# Patient Record
Sex: MALE | Race: WHITE | Employment: UNEMPLOYED | URBAN - METROPOLITAN AREA
[De-identification: names, ages, dates, MRNs, and addresses within clinical notes are randomized per-mention and may not be internally consistent; named-entity substitution may affect disease eponyms.]

---

## 2018-12-15 ENCOUNTER — HOSPITAL ENCOUNTER (EMERGENCY)
Age: 53
Discharge: HOME | End: 2018-12-15
Attending: EMERGENCY MEDICINE
Payer: SELF-PAY

## 2018-12-15 ENCOUNTER — APPOINTMENT (OUTPATIENT)
Dept: CT IMAGING | Age: 53
End: 2018-12-15
Attending: EMERGENCY MEDICINE
Payer: SELF-PAY

## 2018-12-15 VITALS — OXYGEN SATURATION: 96 % | TEMPERATURE: 98.4 F | DIASTOLIC BLOOD PRESSURE: 91 MMHG | SYSTOLIC BLOOD PRESSURE: 175 MMHG

## 2018-12-15 DIAGNOSIS — R11.15 NON-INTRACTABLE CYCLICAL VOMITING WITH NAUSEA: ICD-10-CM

## 2018-12-15 DIAGNOSIS — R10.84 ABDOMINAL PAIN, GENERALIZED: Primary | ICD-10-CM

## 2018-12-15 DIAGNOSIS — Z78.9 ALCOHOL USE: ICD-10-CM

## 2018-12-15 DIAGNOSIS — F12.90 MARIJUANA USE: ICD-10-CM

## 2018-12-15 LAB
ALBUMIN SERPL-MCNC: 4.1 G/DL (ref 3.5–5)
ALBUMIN/GLOB SERPL: 1.1 {RATIO} (ref 1.1–2.2)
ALP SERPL-CCNC: 82 U/L (ref 45–117)
ALT SERPL-CCNC: 22 U/L (ref 12–78)
AMPHET UR QL SCN: NEGATIVE
ANION GAP SERPL CALC-SCNC: 13 MMOL/L (ref 5–15)
APPEARANCE UR: CLEAR
AST SERPL-CCNC: 21 U/L (ref 15–37)
BACTERIA URNS QL MICRO: NEGATIVE /HPF
BARBITURATES UR QL SCN: NEGATIVE
BASOPHILS # BLD: 0.1 K/UL (ref 0–0.1)
BASOPHILS NFR BLD: 1 % (ref 0–1)
BENZODIAZ UR QL: NEGATIVE
BILIRUB SERPL-MCNC: 0.4 MG/DL (ref 0.2–1)
BILIRUB UR QL: NEGATIVE
BUN SERPL-MCNC: 6 MG/DL (ref 6–20)
BUN/CREAT SERPL: 8 (ref 12–20)
CALCIUM SERPL-MCNC: 9.5 MG/DL (ref 8.5–10.1)
CANNABINOIDS UR QL SCN: POSITIVE
CHLORIDE SERPL-SCNC: 98 MMOL/L (ref 97–108)
CO2 SERPL-SCNC: 21 MMOL/L (ref 21–32)
COCAINE UR QL SCN: NEGATIVE
COLOR UR: ABNORMAL
CREAT SERPL-MCNC: 0.71 MG/DL (ref 0.7–1.3)
DIFFERENTIAL METHOD BLD: ABNORMAL
DRUG SCRN COMMENT,DRGCM: ABNORMAL
EOSINOPHIL # BLD: 0.1 K/UL (ref 0–0.4)
EOSINOPHIL NFR BLD: 1 % (ref 0–7)
EPITH CASTS URNS QL MICRO: ABNORMAL /LPF
ERYTHROCYTE [DISTWIDTH] IN BLOOD BY AUTOMATED COUNT: 13 % (ref 11.5–14.5)
ETHANOL SERPL-MCNC: 133 MG/DL
GLOBULIN SER CALC-MCNC: 3.7 G/DL (ref 2–4)
GLUCOSE SERPL-MCNC: 91 MG/DL (ref 65–100)
GLUCOSE UR STRIP.AUTO-MCNC: NEGATIVE MG/DL
HCT VFR BLD AUTO: 42.8 % (ref 36.6–50.3)
HGB BLD-MCNC: 14.8 G/DL (ref 12.1–17)
HGB UR QL STRIP: NEGATIVE
HYALINE CASTS URNS QL MICRO: ABNORMAL /LPF (ref 0–5)
IMM GRANULOCYTES # BLD: 0.1 K/UL (ref 0–0.04)
IMM GRANULOCYTES NFR BLD AUTO: 1 % (ref 0–0.5)
KETONES UR QL STRIP.AUTO: ABNORMAL MG/DL
LEUKOCYTE ESTERASE UR QL STRIP.AUTO: NEGATIVE
LIPASE SERPL-CCNC: 78 U/L (ref 73–393)
LYMPHOCYTES # BLD: 1.7 K/UL (ref 0.8–3.5)
LYMPHOCYTES NFR BLD: 17 % (ref 12–49)
MCH RBC QN AUTO: 31.2 PG (ref 26–34)
MCHC RBC AUTO-ENTMCNC: 34.6 G/DL (ref 30–36.5)
MCV RBC AUTO: 90.1 FL (ref 80–99)
METHADONE UR QL: NEGATIVE
MONOCYTES # BLD: 0.5 K/UL (ref 0–1)
MONOCYTES NFR BLD: 5 % (ref 5–13)
NEUTS SEG # BLD: 7.4 K/UL (ref 1.8–8)
NEUTS SEG NFR BLD: 75 % (ref 32–75)
NITRITE UR QL STRIP.AUTO: NEGATIVE
NRBC # BLD: 0 K/UL (ref 0–0.01)
NRBC BLD-RTO: 0 PER 100 WBC
OPIATES UR QL: NEGATIVE
PCP UR QL: NEGATIVE
PH UR STRIP: 6 [PH] (ref 5–8)
PLATELET # BLD AUTO: 284 K/UL (ref 150–400)
PMV BLD AUTO: 9.3 FL (ref 8.9–12.9)
POTASSIUM SERPL-SCNC: 4 MMOL/L (ref 3.5–5.1)
PROT SERPL-MCNC: 7.8 G/DL (ref 6.4–8.2)
PROT UR STRIP-MCNC: NEGATIVE MG/DL
RBC # BLD AUTO: 4.75 M/UL (ref 4.1–5.7)
RBC #/AREA URNS HPF: ABNORMAL /HPF (ref 0–5)
SODIUM SERPL-SCNC: 132 MMOL/L (ref 136–145)
SP GR UR REFRACTOMETRY: 1.01 (ref 1–1.03)
UA: UC IF INDICATED,UAUC: ABNORMAL
UROBILINOGEN UR QL STRIP.AUTO: 0.2 EU/DL (ref 0.2–1)
WBC # BLD AUTO: 9.9 K/UL (ref 4.1–11.1)
WBC URNS QL MICRO: ABNORMAL /HPF (ref 0–4)

## 2018-12-15 PROCEDURE — 96361 HYDRATE IV INFUSION ADD-ON: CPT

## 2018-12-15 PROCEDURE — 83690 ASSAY OF LIPASE: CPT

## 2018-12-15 PROCEDURE — 74011636320 HC RX REV CODE- 636/320: Performed by: EMERGENCY MEDICINE

## 2018-12-15 PROCEDURE — 74011250636 HC RX REV CODE- 250/636: Performed by: EMERGENCY MEDICINE

## 2018-12-15 PROCEDURE — 80307 DRUG TEST PRSMV CHEM ANLYZR: CPT

## 2018-12-15 PROCEDURE — 96375 TX/PRO/DX INJ NEW DRUG ADDON: CPT

## 2018-12-15 PROCEDURE — 81001 URINALYSIS AUTO W/SCOPE: CPT

## 2018-12-15 PROCEDURE — 74011250637 HC RX REV CODE- 250/637: Performed by: EMERGENCY MEDICINE

## 2018-12-15 PROCEDURE — 80053 COMPREHEN METABOLIC PANEL: CPT

## 2018-12-15 PROCEDURE — 74177 CT ABD & PELVIS W/CONTRAST: CPT

## 2018-12-15 PROCEDURE — 96374 THER/PROPH/DIAG INJ IV PUSH: CPT

## 2018-12-15 PROCEDURE — 36415 COLL VENOUS BLD VENIPUNCTURE: CPT

## 2018-12-15 PROCEDURE — 85025 COMPLETE CBC W/AUTO DIFF WBC: CPT

## 2018-12-15 PROCEDURE — 99284 EMERGENCY DEPT VISIT MOD MDM: CPT

## 2018-12-15 RX ORDER — PROCHLORPERAZINE MALEATE 10 MG
10 TABLET ORAL
Status: COMPLETED | OUTPATIENT
Start: 2018-12-15 | End: 2018-12-15

## 2018-12-15 RX ORDER — PANTOPRAZOLE SODIUM 40 MG/1
40 TABLET, DELAYED RELEASE ORAL DAILY
Qty: 20 TAB | Refills: 0 | Status: SHIPPED | OUTPATIENT
Start: 2018-12-15 | End: 2019-01-04

## 2018-12-15 RX ORDER — MORPHINE SULFATE 4 MG/ML
4 INJECTION INTRAVENOUS
Status: COMPLETED | OUTPATIENT
Start: 2018-12-15 | End: 2018-12-15

## 2018-12-15 RX ORDER — SODIUM CHLORIDE 9 MG/ML
50 INJECTION, SOLUTION INTRAVENOUS
Status: COMPLETED | OUTPATIENT
Start: 2018-12-15 | End: 2018-12-15

## 2018-12-15 RX ORDER — SODIUM CHLORIDE 0.9 % (FLUSH) 0.9 %
10 SYRINGE (ML) INJECTION
Status: COMPLETED | OUTPATIENT
Start: 2018-12-15 | End: 2018-12-15

## 2018-12-15 RX ORDER — ONDANSETRON 4 MG/1
4 TABLET, ORALLY DISINTEGRATING ORAL
Qty: 10 TAB | Refills: 0 | Status: SHIPPED | OUTPATIENT
Start: 2018-12-15 | End: 2019-09-05

## 2018-12-15 RX ORDER — PROCHLORPERAZINE EDISYLATE 5 MG/ML
10 INJECTION INTRAMUSCULAR; INTRAVENOUS
Status: COMPLETED | OUTPATIENT
Start: 2018-12-15 | End: 2018-12-15

## 2018-12-15 RX ADMIN — PROCHLORPERAZINE MALEATE 10 MG: 10 TABLET, FILM COATED ORAL at 07:51

## 2018-12-15 RX ADMIN — MORPHINE SULFATE 4 MG: 4 INJECTION INTRAVENOUS at 08:24

## 2018-12-15 RX ADMIN — SODIUM CHLORIDE 1000 ML: 900 INJECTION, SOLUTION INTRAVENOUS at 05:27

## 2018-12-15 RX ADMIN — IOPAMIDOL 100 ML: 755 INJECTION, SOLUTION INTRAVENOUS at 06:42

## 2018-12-15 RX ADMIN — SODIUM CHLORIDE 50 ML/HR: 900 INJECTION, SOLUTION INTRAVENOUS at 06:42

## 2018-12-15 RX ADMIN — Medication 10 ML: at 06:42

## 2018-12-15 RX ADMIN — PROCHLORPERAZINE EDISYLATE 10 MG: 5 INJECTION INTRAMUSCULAR; INTRAVENOUS at 05:27

## 2018-12-15 NOTE — ED NOTES
Pt's emergency contact Othelia Halsted contacted for pt ride home. Stated pt's sister will be contacted for possible . To call Charge RN back.

## 2018-12-15 NOTE — ED NOTES
Pt's friend to pick pt up from waiting room. Pt safely escorted to waiting room area. No acute changes noted and pt took discharge paperwork with him.

## 2018-12-15 NOTE — DISCHARGE INSTRUCTIONS

## 2018-12-15 NOTE — ED PROVIDER NOTES
EMERGENCY DEPARTMENT HISTORY AND PHYSICAL EXAM      Date: 12/15/2018  Patient Name: Monica Akbar    History of Presenting Illness     Chief Complaint   Patient presents with    Vomiting    Abdominal Pain       History Provided By: Patient and EMS    HPI: Monica Akbar, 46 y.o. male with PMHx significant for alcohol induced pancreatitis, presents via EMS to the ED with c/o new onset N/V with associated generalized abdominal pain that began 2 hours ago. Per EMS, pt had a few episodes of vomiting en route to the ED. He reports taking anti-emetic when his symptoms first began without any improvement. Pt endorses hx of similar symptoms that typically resolve with \"pain pill\" and antiemetics. He states he has been told his symptoms were related to pancreatitis and SBO in the past. He endorses Marijuana use regularly, but states his last use was 2-3 days ago. He also discloses alcohol use today. Pt notes his most recent BM was 1 day ago. Per EMS, pt was tachycardiac and mildly hypertensive during transport. EMS notes pt hasn't taken his BP medication in 2 weeks. He denies any recent fever. There are no other complaints, changes, or physical findings at this time. PCP: None    Current Outpatient Medications   Medication Sig Dispense Refill    ondansetron (ZOFRAN ODT) 4 mg disintegrating tablet Take 1 Tab by mouth every eight (8) hours as needed for Nausea. 10 Tab 0    pantoprazole (PROTONIX) 40 mg tablet Take 1 Tab by mouth daily for 20 days. 20 Tab 0    oxyCODONE-acetaminophen (PERCOCET) 5-325 mg per tablet Take 2 Tabs by mouth every four (4) hours as needed for Pain. Max Daily Amount: 12 Tabs. 5 Tab 0    lisinopril (PRINIVIL, ZESTRIL) 10 mg tablet Take 10 mg by mouth daily.  cyclobenzaprine (FLEXERIL) 5 mg tablet Take 5 mg by mouth two (2) times a day.  busPIRone (BUSPAR) 7.5 mg tablet Take 7.5 mg by mouth three (3) times daily.       famotidine (PEPCID) 40 mg tablet Take 1 Tab by mouth nightly. 30 Tab 0    promethazine (PHENERGAN) 25 mg suppository Insert 1 Suppository into rectum every six (6) hours as needed for Nausea. 12 Suppository 0       Past History     Past Medical History:  Past Medical History:   Diagnosis Date    Gastrointestinal disorder     pancreatitis    Gastrointestinal disorder     mass       Past Surgical History:  History reviewed. No pertinent surgical history. Family History:  No family history on file. Social History:  Social History     Tobacco Use    Smoking status: Current Every Day Smoker     Packs/day: 1.00    Smokeless tobacco: Never Used   Substance Use Topics    Alcohol use: Yes     Alcohol/week: 6.0 oz     Types: 12 Cans of beer per week     Comment: 20/week    Drug use: Yes     Types: Marijuana     Comment: occasionally       Allergies: Allergies   Allergen Reactions    Bees [Hymenoptera Allergenic Extract] Hives    Codeine Hives         Review of Systems   Review of Systems   Constitutional: Negative for chills and fever. HENT: Negative. Negative for congestion, rhinorrhea, sneezing and sore throat. Eyes: Negative. Negative for redness and visual disturbance. Respiratory: Negative. Negative for cough, shortness of breath and wheezing. Cardiovascular: Negative. Negative for chest pain and leg swelling. Gastrointestinal: Positive for abdominal pain, nausea and vomiting. Negative for diarrhea. Genitourinary: Negative. Negative for difficulty urinating, discharge and frequency. Musculoskeletal: Negative. Negative for arthralgias, back pain, myalgias and neck stiffness. Skin: Negative. Negative for color change and rash. Neurological: Negative. Negative for dizziness, syncope, weakness, numbness and headaches. Hematological: Negative for adenopathy. Psychiatric/Behavioral: Negative. All other systems reviewed and are negative.       Physical Exam   Physical Exam   Constitutional: He is oriented to person, place, and time. Active vomiting. Emesis in bag. Uncomfortable appearing    HENT:   Head: Atraumatic. Eyes: EOM are normal.   Cardiovascular: Regular rhythm, normal heart sounds and intact distal pulses. Tachycardia present. Exam reveals no gallop and no friction rub. No murmur heard. Pulmonary/Chest: Effort normal and breath sounds normal. No respiratory distress. He has no wheezes. He has no rales. He exhibits no tenderness. Abdominal: Soft. Bowel sounds are normal. He exhibits no distension and no mass. There is no tenderness. There is no rebound and no guarding. Musculoskeletal: Normal range of motion. He exhibits no edema or tenderness. Neurological: He is alert and oriented to person, place, and time. Psychiatric: He has a normal mood and affect. Nursing note and vitals reviewed. Diagnostic Study Results     Labs -     Recent Results (from the past 12 hour(s))   CBC WITH AUTOMATED DIFF    Collection Time: 12/15/18  5:25 AM   Result Value Ref Range    WBC 9.9 4.1 - 11.1 K/uL    RBC 4.75 4.10 - 5.70 M/uL    HGB 14.8 12.1 - 17.0 g/dL    HCT 42.8 36.6 - 50.3 %    MCV 90.1 80.0 - 99.0 FL    MCH 31.2 26.0 - 34.0 PG    MCHC 34.6 30.0 - 36.5 g/dL    RDW 13.0 11.5 - 14.5 %    PLATELET 640 391 - 194 K/uL    MPV 9.3 8.9 - 12.9 FL    NRBC 0.0 0  WBC    ABSOLUTE NRBC 0.00 0.00 - 0.01 K/uL    NEUTROPHILS 75 32 - 75 %    LYMPHOCYTES 17 12 - 49 %    MONOCYTES 5 5 - 13 %    EOSINOPHILS 1 0 - 7 %    BASOPHILS 1 0 - 1 %    IMMATURE GRANULOCYTES 1 (H) 0.0 - 0.5 %    ABS. NEUTROPHILS 7.4 1.8 - 8.0 K/UL    ABS. LYMPHOCYTES 1.7 0.8 - 3.5 K/UL    ABS. MONOCYTES 0.5 0.0 - 1.0 K/UL    ABS. EOSINOPHILS 0.1 0.0 - 0.4 K/UL    ABS. BASOPHILS 0.1 0.0 - 0.1 K/UL    ABS. IMM.  GRANS. 0.1 (H) 0.00 - 0.04 K/UL    DF AUTOMATED     METABOLIC PANEL, COMPREHENSIVE    Collection Time: 12/15/18  5:25 AM   Result Value Ref Range    Sodium 132 (L) 136 - 145 mmol/L    Potassium 4.0 3.5 - 5.1 mmol/L    Chloride 98 97 - 108 mmol/L CO2 21 21 - 32 mmol/L    Anion gap 13 5 - 15 mmol/L    Glucose 91 65 - 100 mg/dL    BUN 6 6 - 20 MG/DL    Creatinine 0.71 0.70 - 1.30 MG/DL    BUN/Creatinine ratio 8 (L) 12 - 20      GFR est AA >60 >60 ml/min/1.73m2    GFR est non-AA >60 >60 ml/min/1.73m2    Calcium 9.5 8.5 - 10.1 MG/DL    Bilirubin, total 0.4 0.2 - 1.0 MG/DL    ALT (SGPT) 22 12 - 78 U/L    AST (SGOT) 21 15 - 37 U/L    Alk.  phosphatase 82 45 - 117 U/L    Protein, total 7.8 6.4 - 8.2 g/dL    Albumin 4.1 3.5 - 5.0 g/dL    Globulin 3.7 2.0 - 4.0 g/dL    A-G Ratio 1.1 1.1 - 2.2     LIPASE    Collection Time: 12/15/18  5:25 AM   Result Value Ref Range    Lipase 78 73 - 393 U/L   ETHYL ALCOHOL    Collection Time: 12/15/18  6:27 AM   Result Value Ref Range    ALCOHOL(ETHYL),SERUM 133 (H) <10 MG/DL   URINALYSIS W/ REFLEX CULTURE    Collection Time: 12/15/18  6:43 AM   Result Value Ref Range    Color YELLOW/STRAW      Appearance CLEAR CLEAR      Specific gravity 1.010 1.003 - 1.030      pH (UA) 6.0 5.0 - 8.0      Protein NEGATIVE  NEG mg/dL    Glucose NEGATIVE  NEG mg/dL    Ketone TRACE (A) NEG mg/dL    Bilirubin NEGATIVE  NEG      Blood NEGATIVE  NEG      Urobilinogen 0.2 0.2 - 1.0 EU/dL    Nitrites NEGATIVE  NEG      Leukocyte Esterase NEGATIVE  NEG      WBC 0-4 0 - 4 /hpf    RBC 0-5 0 - 5 /hpf    Epithelial cells FEW FEW /lpf    Bacteria NEGATIVE  NEG /hpf    UA:UC IF INDICATED CULTURE NOT INDICATED BY UA RESULT CNI      Hyaline cast 0-2 0 - 5 /lpf   DRUG SCREEN, URINE    Collection Time: 12/15/18  6:43 AM   Result Value Ref Range    AMPHETAMINES NEGATIVE  NEG      BARBITURATES NEGATIVE  NEG      BENZODIAZEPINES NEGATIVE  NEG      COCAINE NEGATIVE  NEG      METHADONE NEGATIVE  NEG      OPIATES NEGATIVE  NEG      PCP(PHENCYCLIDINE) NEGATIVE  NEG      THC (TH-CANNABINOL) POSITIVE (A) NEG      Drug screen comment (NOTE)        Radiologic Studies -     CT Results  (Last 48 hours)               12/15/18 0642  CT ABD PELV W CONT Final result    Impression: IMPRESSION:    No acute abnormality in the abdomen or pelvis. Narrative:  EXAM:  CT abdomen pelvis with contrast       INDICATION: Abdominal pain. COMPARISON: CT 4/30/2016. TECHNIQUE: Helical CT of the abdomen  and pelvis  following the uneventful   intravenous administration of nonionic contrast.  Coronal and sagittal reformats   are performed. CT dose reduction was achieved through use of a standardized   protocol tailored for this examination and automatic exposure control for dose   modulation. FINDINGS:    The visualized lung bases demonstrate no mass or consolidation. The heart size   is normal. There is no pericardial or pleural effusion. There is a small hiatal   hernia. The liver, spleen, pancreas, and adrenal glands are normal. The gall bladder is   present  without intra- or extra-hepatic biliary dilatation. The kidneys are symmetric without hydronephrosis. There are no dilated bowel loops. The appendix is normal.         There are no enlarged lymph nodes. There is no free fluid or free air. The   aorta tapers without aneurysm. There is aortoiliac atherosclerosis. The urinary bladder is normal.  There is no pelvic mass. The prostate is not   enlarged. The bony structures are age-appropriate. Medical Decision Making   I am the first provider for this patient. I reviewed the vital signs, available nursing notes, past medical history, past surgical history, family history and social history. Vital Signs-Reviewed the patient's vital signs.   Patient Vitals for the past 12 hrs:   Temp BP SpO2   12/15/18 0752  (!) 175/91 96 %   12/15/18 0748 98.4 °F (36.9 °C)     12/15/18 0715   94 %   12/15/18 0700   95 %   12/15/18 0630   96 %   12/15/18 0615  149/80 97 %   12/15/18 0600  147/79 95 %       Pulse Oximetry Analysis - 96% on RA    Records Reviewed: Nursing Notes, Old Medical Records, Previous Radiology Studies and Previous Laboratory Studies    Provider Notes (Medical Decision Making):   DDx: cyclic vomiting syndrome, marijuana use, gastroparesis, gastritis, ileus, SBO, pancreatitis     ED Course:   Initial assessment performed. The patients presenting problems have been discussed, and they are in agreement with the care plan formulated and outlined with them. I have encouraged them to ask questions as they arise throughout their visit. PROGRESS NOTE:  8:00 AM  Pt's N/V well controlled at time of discharge and she passed PO challenge. Written by Ignacia August, ED Scribe, as dictated by Yuri Belle MD.    Discharge Note:  8:00 AM  The patient has been re-evaluated and is ready for discharge. Reviewed available results with patient. Counseled patient on diagnosis and care plan. Patient has expressed understanding, and all questions have been answered. Patient agrees with plan and agrees to follow up as recommended, or to return to the ED if their symptoms worsen. Discharge instructions have been provided and explained to the patient, along with reasons to return to the ED. PLAN:  1. Current Discharge Medication List      START taking these medications    Details   pantoprazole (PROTONIX) 40 mg tablet Take 1 Tab by mouth daily for 20 days. Qty: 20 Tab, Refills: 0         CONTINUE these medications which have CHANGED    Details   ondansetron (ZOFRAN ODT) 4 mg disintegrating tablet Take 1 Tab by mouth every eight (8) hours as needed for Nausea. Qty: 10 Tab, Refills: 0           2.    Follow-up Information     Follow up With Specialties Details Why Contact Info    None  In 2 days  None (395) Patient stated that they have no PCP      Ijeoma Tee MD Gastroenterology In 1 week  [address] [facility]  [phone]      [facility] EMERGENCY DEPT Emergency Medicine  If symptoms worsen [address]  [address] [facility]  [phone]        Return to ED if worse     Diagnosis     Clinical Impression:   1. Abdominal pain, generalized    2. Alcohol use    3. Non-intractable cyclical vomiting with nausea    4. Marijuana use        Attestations: This note is prepared by Kendal Herrera, acting as Scribe for Yuri Belle MD.    The scribe's documentation has been prepared under my direction and personally reviewed by me in its entirety. I confirm that the note above accurately reflects all work, treatment, procedures, and medical decision making performed by me.   Yuri Belle MD

## 2019-09-05 ENCOUNTER — HOSPITAL ENCOUNTER (EMERGENCY)
Age: 54
Discharge: HOME | End: 2019-09-05
Attending: EMERGENCY MEDICINE | Admitting: EMERGENCY MEDICINE
Payer: COMMERCIAL

## 2019-09-05 VITALS
RESPIRATION RATE: 18 BRPM | OXYGEN SATURATION: 98 % | SYSTOLIC BLOOD PRESSURE: 128 MMHG | TEMPERATURE: 97.6 F | DIASTOLIC BLOOD PRESSURE: 69 MMHG | HEART RATE: 66 BPM

## 2019-09-05 DIAGNOSIS — R11.2 NAUSEA AND VOMITING, INTRACTABILITY OF VOMITING NOT SPECIFIED, UNSPECIFIED VOMITING TYPE: Primary | ICD-10-CM

## 2019-09-05 LAB
ALBUMIN SERPL-MCNC: 4.1 G/DL (ref 3.5–5)
ALBUMIN/GLOB SERPL: 1.1 {RATIO} (ref 1.1–2.2)
ALP SERPL-CCNC: 74 U/L (ref 45–117)
ALT SERPL-CCNC: 27 U/L (ref 12–78)
ANION GAP SERPL CALC-SCNC: 12 MMOL/L (ref 5–15)
APPEARANCE UR: CLEAR
AST SERPL-CCNC: 25 U/L (ref 15–37)
BASOPHILS # BLD: 0.1 K/UL (ref 0–0.1)
BASOPHILS NFR BLD: 1 % (ref 0–1)
BILIRUB SERPL-MCNC: 0.6 MG/DL (ref 0.2–1)
BILIRUB UR QL: NEGATIVE
BUN SERPL-MCNC: 9 MG/DL (ref 6–20)
BUN/CREAT SERPL: 11 (ref 12–20)
CALCIUM SERPL-MCNC: 8.4 MG/DL (ref 8.5–10.1)
CHLORIDE SERPL-SCNC: 96 MMOL/L (ref 97–108)
CO2 SERPL-SCNC: 22 MMOL/L (ref 21–32)
COLOR UR: NORMAL
CREAT SERPL-MCNC: 0.82 MG/DL (ref 0.7–1.3)
DIFFERENTIAL METHOD BLD: ABNORMAL
EOSINOPHIL # BLD: 0.3 K/UL (ref 0–0.4)
EOSINOPHIL NFR BLD: 2 % (ref 0–7)
ERYTHROCYTE [DISTWIDTH] IN BLOOD BY AUTOMATED COUNT: 13.3 % (ref 11.5–14.5)
ETHANOL SERPL-MCNC: 172 MG/DL
GLOBULIN SER CALC-MCNC: 3.7 G/DL (ref 2–4)
GLUCOSE SERPL-MCNC: 100 MG/DL (ref 65–100)
GLUCOSE UR STRIP.AUTO-MCNC: NEGATIVE MG/DL
HCT VFR BLD AUTO: 44.7 % (ref 36.6–50.3)
HGB BLD-MCNC: 15.5 G/DL (ref 12.1–17)
HGB UR QL STRIP: NEGATIVE
IMM GRANULOCYTES # BLD AUTO: 0 K/UL (ref 0–0.04)
IMM GRANULOCYTES NFR BLD AUTO: 0 % (ref 0–0.5)
KETONES UR QL STRIP.AUTO: NEGATIVE MG/DL
LEUKOCYTE ESTERASE UR QL STRIP.AUTO: NEGATIVE
LIPASE SERPL-CCNC: 102 U/L (ref 73–393)
LYMPHOCYTES # BLD: 2.8 K/UL (ref 0.8–3.5)
LYMPHOCYTES NFR BLD: 25 % (ref 12–49)
MAGNESIUM SERPL-MCNC: 1.9 MG/DL (ref 1.6–2.4)
MCH RBC QN AUTO: 31.7 PG (ref 26–34)
MCHC RBC AUTO-ENTMCNC: 34.7 G/DL (ref 30–36.5)
MCV RBC AUTO: 91.4 FL (ref 80–99)
MONOCYTES # BLD: 0.7 K/UL (ref 0–1)
MONOCYTES NFR BLD: 6 % (ref 5–13)
NEUTS SEG # BLD: 7.2 K/UL (ref 1.8–8)
NEUTS SEG NFR BLD: 66 % (ref 32–75)
NITRITE UR QL STRIP.AUTO: NEGATIVE
NRBC # BLD: 0 K/UL (ref 0–0.01)
NRBC BLD-RTO: 0 PER 100 WBC
PH UR STRIP: 5.5 [PH] (ref 5–8)
PLATELET # BLD AUTO: 247 K/UL (ref 150–400)
PMV BLD AUTO: 9.6 FL (ref 8.9–12.9)
POTASSIUM SERPL-SCNC: 3.4 MMOL/L (ref 3.5–5.1)
PROT SERPL-MCNC: 7.8 G/DL (ref 6.4–8.2)
PROT UR STRIP-MCNC: NEGATIVE MG/DL
RBC # BLD AUTO: 4.89 M/UL (ref 4.1–5.7)
SODIUM SERPL-SCNC: 130 MMOL/L (ref 136–145)
SP GR UR REFRACTOMETRY: 1.01 (ref 1–1.03)
UROBILINOGEN UR QL STRIP.AUTO: 0.2 EU/DL (ref 0.2–1)
WBC # BLD AUTO: 11.2 K/UL (ref 4.1–11.1)

## 2019-09-05 PROCEDURE — 83690 ASSAY OF LIPASE: CPT

## 2019-09-05 PROCEDURE — 99284 EMERGENCY DEPT VISIT MOD MDM: CPT

## 2019-09-05 PROCEDURE — 96374 THER/PROPH/DIAG INJ IV PUSH: CPT

## 2019-09-05 PROCEDURE — 80053 COMPREHEN METABOLIC PANEL: CPT

## 2019-09-05 PROCEDURE — C9113 INJ PANTOPRAZOLE SODIUM, VIA: HCPCS | Performed by: EMERGENCY MEDICINE

## 2019-09-05 PROCEDURE — 74011250636 HC RX REV CODE- 250/636: Performed by: EMERGENCY MEDICINE

## 2019-09-05 PROCEDURE — 83735 ASSAY OF MAGNESIUM: CPT

## 2019-09-05 PROCEDURE — 80307 DRUG TEST PRSMV CHEM ANLYZR: CPT

## 2019-09-05 PROCEDURE — 81003 URINALYSIS AUTO W/O SCOPE: CPT

## 2019-09-05 PROCEDURE — 96375 TX/PRO/DX INJ NEW DRUG ADDON: CPT

## 2019-09-05 PROCEDURE — 36415 COLL VENOUS BLD VENIPUNCTURE: CPT

## 2019-09-05 PROCEDURE — 85025 COMPLETE CBC W/AUTO DIFF WBC: CPT

## 2019-09-05 RX ORDER — FAMOTIDINE 40 MG/1
40 TABLET, FILM COATED ORAL 2 TIMES DAILY
Qty: 30 TAB | Refills: 0 | Status: SHIPPED | OUTPATIENT
Start: 2019-09-05 | End: 2020-01-17 | Stop reason: ALTCHOICE

## 2019-09-05 RX ORDER — METOCLOPRAMIDE 10 MG/1
10 TABLET ORAL
Qty: 12 TAB | Refills: 0 | Status: SHIPPED | OUTPATIENT
Start: 2019-09-05 | End: 2020-01-17 | Stop reason: ALTCHOICE

## 2019-09-05 RX ORDER — PANTOPRAZOLE SODIUM 40 MG/10ML
40 INJECTION, POWDER, LYOPHILIZED, FOR SOLUTION INTRAVENOUS
Status: COMPLETED | OUTPATIENT
Start: 2019-09-05 | End: 2019-09-05

## 2019-09-05 RX ORDER — HALOPERIDOL 5 MG/ML
5 INJECTION INTRAMUSCULAR
Status: COMPLETED | OUTPATIENT
Start: 2019-09-05 | End: 2019-09-05

## 2019-09-05 RX ADMIN — PANTOPRAZOLE SODIUM 40 MG: 40 INJECTION, POWDER, FOR SOLUTION INTRAVENOUS at 01:43

## 2019-09-05 RX ADMIN — SODIUM CHLORIDE 1000 ML: 900 INJECTION, SOLUTION INTRAVENOUS at 01:40

## 2019-09-05 RX ADMIN — HALOPERIDOL LACTATE 5 MG: 5 INJECTION, SOLUTION INTRAMUSCULAR at 01:41

## 2019-09-05 NOTE — DISCHARGE INSTRUCTIONS
Patient Education        Nausea and Vomiting: Care Instructions  Your Care Instructions  When you are nauseated, you may feel weak and sweaty and notice a lot of saliva in your mouth. Nausea often leads to vomiting. Most of the time you do not need to worry about nausea and vomiting, but they can be signs of other illnesses. Two common causes of nausea and vomiting are stomach flu and food poisoning. Nausea and vomiting from viral stomach flu will usually start to improve within 24 hours. Nausea and vomiting from food poisoning may last from 12 to 48 hours. The doctor has checked you carefully, but problems can develop later. If you notice any problems or new symptoms, get medical treatment right away. Follow-up care is a key part of your treatment and safety. Be sure to make and go to all appointments, and call your doctor if you are having problems. It's also a good idea to know your test results and keep a list of the medicines you take. How can you care for yourself at home? · To prevent dehydration, drink plenty of fluids, enough so that your urine is light yellow or clear like water. Choose water and other caffeine-free clear liquids until you feel better. If you have kidney, heart, or liver disease and have to limit fluids, talk with your doctor before you increase the amount of fluids you drink. · Rest in bed until you feel better. · When you are able to eat, try clear soups, mild foods, and liquids until all symptoms are gone for 12 to 48 hours. Other good choices include dry toast, crackers, cooked cereal, and gelatin dessert, such as Jell-O. When should you call for help? Call 911 anytime you think you may need emergency care. For example, call if:    · You passed out (lost consciousness).    Call your doctor now or seek immediate medical care if:    · You have symptoms of dehydration, such as:  ? Dry eyes and a dry mouth. ? Passing only a little dark urine. ?  Feeling thirstier than usual.     · You have new or worsening belly pain.     · You have a new or higher fever.     · You vomit blood or what looks like coffee grounds.    Watch closely for changes in your health, and be sure to contact your doctor if:    · You have ongoing nausea and vomiting.     · Your vomiting is getting worse.     · Your vomiting lasts longer than 2 days.     · You are not getting better as expected. Where can you learn more? Go to http://raiza-stanley.info/. Enter [id number] in the search box to learn more about \"Nausea and Vomiting: Care Instructions. \"  Current as of: September 23, 2018  Content Version: 12.1  © 9919-8403 [employer], [facility]. Care instructions adapted under license by [employer] (which disclaims liability or warranty for this information). If you have questions about a medical condition or this instruction, always ask your healthcare professional. [address] any warranty or liability for your use of this information.

## 2019-09-05 NOTE — ED NOTES
Dr. Brielle Salinas has reviewed discharge instructions with the patient. The patient verbalized understanding. Pt ambulated out of department in no distress. VSS. Round-trip set up for ride home.

## 2019-09-05 NOTE — ED NOTES
Pt presents via EMS c/o nausea and vomiting for the past hour. ETOH on board - pt sts he drank \"at least 7 beers today\". Pt sts he drinks approx. Every other day, but usually not this month. Pt denies CP/SOB. C/o epigastric burning \"from throwing up so much\". Pt sts \"this happens sometimes and they give me those shots for nausea and pain\". A/ox4.

## 2019-09-05 NOTE — ED PROVIDER NOTES
EMERGENCY DEPARTMENT HISTORY AND PHYSICAL EXAM          Date: 9/5/2019  Patient Name: Diomedes Keenan    History of Presenting Illness     Chief Complaint   Patient presents with    Nausea    Vomiting       History Provided By: Patient    HPI: Diomedes Keenan is a 53 y.o. male, pmhx pancreatitis, who presents via EMS to the ED c/o nausea and vomiting    Patient states he has been doing fine and then developed sudden onset of nausea and vomiting a couple hours ago. He notes that it quickly led to some left upper abdominal pain. He normally takes some antiemetics for his symptoms when this starts but he did not have any at home so he called EMS for transport. Patient denies any fevers/ chills, blood in his stools or diarrhea with these symptoms. He notes he has had multiple episodes of similar in the past.  He also notes that his gastroenterologist is at [place] and his last EGD and colonoscopy were normal but cannot remember the date. He admits to multiple alcoholic drinks today prior to the onset of his symptoms    PCP: Unknown, Provider    Allergies: Codeine  Social Hx: +tobacco, +EtOH, +Illicit Drugs    There are no other complaints, changes, or physical findings at this time. Current Outpatient Medications   Medication Sig Dispense Refill    famotidine (PEPCID) 40 mg tablet Take 1 Tab by mouth two (2) times a day. 30 Tab 0    metoclopramide HCl (REGLAN) 10 mg tablet Take 1 Tab by mouth Before breakfast, lunch, and dinner. 12 Tab 0    lisinopril-hydroCHLOROthiazide (PRINZIDE, ZESTORETIC) 10-12.5 mg per tablet Take 1 Tab by mouth daily. 90 Tab 3    busPIRone (BUSPAR) 7.5 mg tablet Take 7.5 mg by mouth three (3) times daily. Past History     Past Medical History:  Past Medical History:   Diagnosis Date    Gastrointestinal disorder     pancreatitis    Gastrointestinal disorder     mass       Past Surgical History:  History reviewed. No pertinent surgical history.     Family History:  History reviewed. No pertinent family history. Social History:  Social History     Tobacco Use    Smoking status: Current Every Day Smoker     Packs/day: 1.00    Smokeless tobacco: Never Used   Substance Use Topics    Alcohol use: Yes     Alcohol/week: 10.0 standard drinks     Types: 12 Cans of beer per week     Comment: 20/week    Drug use: Yes     Types: Marijuana     Comment: occasionally       Allergies: Allergies   Allergen Reactions    Bees [Hymenoptera Allergenic Extract] Hives    Codeine Hives         Review of Systems   Review of Systems   Constitutional: Negative for activity change, appetite change, chills, fever and unexpected weight change. HENT: Negative for congestion. Eyes: Negative for pain and visual disturbance. Respiratory: Negative for cough and shortness of breath. Cardiovascular: Negative for chest pain. Gastrointestinal: Positive for abdominal distention, abdominal pain, nausea and vomiting. Negative for anal bleeding, blood in stool, constipation and diarrhea. Genitourinary: Negative for dysuria. Musculoskeletal: Negative for back pain. Skin: Negative for rash. Neurological: Negative for headaches. Physical Exam   Physical Exam   Constitutional: He is oriented to person, place, and time. He appears well-developed and well-nourished. This is a middle-aged male who appears disheveled in moderate distress sitting up in the gurney unable to lay still for evaluation as he is actively vomiting. HENT:   Head: Normocephalic and atraumatic. Eyes: Pupils are equal, round, and reactive to light. Conjunctivae and EOM are normal. Right eye exhibits no discharge. Left eye exhibits no discharge. Neck: Normal range of motion. Neck supple. Cardiovascular: Normal rate, regular rhythm and normal heart sounds. No murmur heard. Pulmonary/Chest: Effort normal and breath sounds normal. No respiratory distress. He has no wheezes. He has no rales.    Abdominal: Unable to examine patient due to his intolerable pain   Musculoskeletal: Normal range of motion. He exhibits no edema. Neurological: He is alert and oriented to person, place, and time. No cranial nerve deficit. He exhibits normal muscle tone. Skin: Skin is warm and dry. No rash noted. He is not diaphoretic. Nursing note and vitals reviewed. Diagnostic Study Results     Labs -     Recent Results (from the past 12 hour(s))   CBC WITH AUTOMATED DIFF    Collection Time: 09/05/19  1:47 AM   Result Value Ref Range    WBC 11.2 (H) 4.1 - 11.1 K/uL    RBC 4.89 4. 10 - 5.70 M/uL    HGB 15.5 12.1 - 17.0 g/dL    HCT 44.7 36.6 - 50.3 %    MCV 91.4 80.0 - 99.0 FL    MCH 31.7 26.0 - 34.0 PG    MCHC 34.7 30.0 - 36.5 g/dL    RDW 13.3 11.5 - 14.5 %    PLATELET 053 069 - 260 K/uL    MPV 9.6 8.9 - 12.9 FL    NRBC 0.0 0  WBC    ABSOLUTE NRBC 0.00 0.00 - 0.01 K/uL    NEUTROPHILS 66 32 - 75 %    LYMPHOCYTES 25 12 - 49 %    MONOCYTES 6 5 - 13 %    EOSINOPHILS 2 0 - 7 %    BASOPHILS 1 0 - 1 %    IMMATURE GRANULOCYTES 0 0.0 - 0.5 %    ABS. NEUTROPHILS 7.2 1.8 - 8.0 K/UL    ABS. LYMPHOCYTES 2.8 0.8 - 3.5 K/UL    ABS. MONOCYTES 0.7 0.0 - 1.0 K/UL    ABS. EOSINOPHILS 0.3 0.0 - 0.4 K/UL    ABS. BASOPHILS 0.1 0.0 - 0.1 K/UL    ABS. IMM. GRANS. 0.0 0.00 - 0.04 K/UL    DF AUTOMATED     METABOLIC PANEL, COMPREHENSIVE    Collection Time: 09/05/19  1:47 AM   Result Value Ref Range    Sodium 130 (L) 136 - 145 mmol/L    Potassium 3.4 (L) 3.5 - 5.1 mmol/L    Chloride 96 (L) 97 - 108 mmol/L    CO2 22 21 - 32 mmol/L    Anion gap 12 5 - 15 mmol/L    Glucose 100 65 - 100 mg/dL    BUN 9 6 - 20 MG/DL    Creatinine 0.82 0.70 - 1.30 MG/DL    BUN/Creatinine ratio 11 (L) 12 - 20      GFR est AA >60 >60 ml/min/1.73m2    GFR est non-AA >60 >60 ml/min/1.73m2    Calcium 8.4 (L) 8.5 - 10.1 MG/DL    Bilirubin, total 0.6 0.2 - 1.0 MG/DL    ALT (SGPT) 27 12 - 78 U/L    AST (SGOT) 25 15 - 37 U/L    Alk.  phosphatase 74 45 - 117 U/L    Protein, total 7.8 6.4 - 8.2 g/dL    Albumin 4.1 3.5 - 5.0 g/dL    Globulin 3.7 2.0 - 4.0 g/dL    A-G Ratio 1.1 1.1 - 2.2     ETHYL ALCOHOL    Collection Time: 09/05/19  1:47 AM   Result Value Ref Range    ALCOHOL(ETHYL),SERUM 172 (H) <10 MG/DL   LIPASE    Collection Time: 09/05/19  1:47 AM   Result Value Ref Range    Lipase 102 73 - 393 U/L   MAGNESIUM    Collection Time: 09/05/19  1:47 AM   Result Value Ref Range    Magnesium 1.9 1.6 - 2.4 mg/dL   URINALYSIS W/ RFLX MICROSCOPIC    Collection Time: 09/05/19  2:57 AM   Result Value Ref Range    Color YELLOW/STRAW      Appearance CLEAR CLEAR      Specific gravity 1.010 1.003 - 1.030      pH (UA) 5.5 5.0 - 8.0      Protein NEGATIVE  NEG mg/dL    Glucose NEGATIVE  NEG mg/dL    Ketone NEGATIVE  NEG mg/dL    Bilirubin NEGATIVE  NEG      Blood NEGATIVE  NEG      Urobilinogen 0.2 0.2 - 1.0 EU/dL    Nitrites NEGATIVE  NEG      Leukocyte Esterase NEGATIVE  NEG         Radiologic Studies -   No orders to display     CT Results  (Last 48 hours)    None        CXR Results  (Last 48 hours)    None            Medical Decision Making   I am the first provider for this patient. I reviewed the vital signs, available nursing notes, past medical history, past surgical history, family history and social history. Vital Signs-Reviewed the patient's vital signs.   Patient Vitals for the past 12 hrs:   Temp Pulse Resp BP SpO2   09/05/19 0311     98 %   09/05/19 0300    128/69 98 %   09/05/19 0200  66  144/90 95 %   09/05/19 0154     97 %   09/05/19 0133 97.6 °F (36.4 °C) 91 18 (!) 135/97 99 %       Pulse Oximetry Analysis - 97% on RA    Cardiac Monitor:   Rate: 91bpm  Rhythm: Normal Sinus Rhythm      Records Reviewed: Nursing Notes, Old Medical Records, Ambulance Run Sheet, Previous Radiology Studies and Previous Laboratory Studies    Provider Notes (Medical Decision Making):   MDM: Middle-aged male alcoholic with a history of pancreatitis presenting with repeat alcohol intake and abdominal pain.  Will initiate medical management while awaiting lab analysis and then attempt to repeat abdominal exam.    ED Course:   Initial assessment performed. The patients presenting problems have been discussed, and they are in agreement with the care plan formulated and outlined with them. I have encouraged them to ask questions as they arise throughout their visit. PROGRESS NOTE:  2:15 AM  Pt resting comfortably without any further nausea or vomiting. His pain is no longer present after medications. Await results. 3:30 AM  Patient doing well and tolerating p.o. challenge without difficulty. AVSS been provided with prescriptions for outpatient Reglan and Pepcid for the next few days. Recommend patient to follow-up with his gastroenterologist.  Further return precautions have been reviewed with patient and have been provided in the AVS.    Critical Care Time:   0      Diagnosis     Clinical Impression:   1. Nausea and vomiting, intractability of vomiting not specified, unspecified vomiting type        PLAN:  1. Current Discharge Medication List      START taking these medications    Details   metoclopramide HCl (REGLAN) 10 mg tablet Take 1 Tab by mouth Before breakfast, lunch, and dinner. Qty: 12 Tab, Refills: 0         CONTINUE these medications which have CHANGED    Details   famotidine (PEPCID) 40 mg tablet Take 1 Tab by mouth two (2) times a day. Qty: 30 Tab, Refills: 0         CONTINUE these medications which have NOT CHANGED    Details   lisinopril-hydroCHLOROthiazide (PRINZIDE, ZESTORETIC) 10-12.5 mg per tablet Take 1 Tab by mouth daily. Qty: 90 Tab, Refills: 3    Associated Diagnoses: HTN, goal below 140/90      busPIRone (BUSPAR) 7.5 mg tablet Take 7.5 mg by mouth three (3) times daily.          STOP taking these medications       ondansetron (ZOFRAN ODT) 4 mg disintegrating tablet Comments:   Reason for Stopping:         oxyCODONE-acetaminophen (PERCOCET) 5-325 mg per tablet Comments: Reason for Stopping:         cyclobenzaprine (FLEXERIL) 5 mg tablet Comments:   Reason for Stopping:         promethazine (PHENERGAN) 25 mg suppository Comments:   Reason for Stoppin.   Follow-up Information     Follow up With Specialties Details Why Contact Info    Your GI  Schedule an appointment as soon as possible for a visit If your symptoms continue     [facility] EMERGENCY DEPT Emergency Medicine  If symptoms worsen [address]  [address] [facility]  [phone]        Return to ED if worse     Disposition:  home      Please note, this dictation was completed with Sennari, the 3dim voice recognition software. Quite often unanticipated grammatical, syntax, homophones, and other interpretive errors are inadvertently transcribed by the computer software. Please disregard these errors. Please excuse any errors that have escaped final proof reading.

## 2020-01-17 ENCOUNTER — OFFICE VISIT (OUTPATIENT)
Dept: FAMILY MEDICINE CLINIC | Age: 55
End: 2020-01-17

## 2020-01-17 VITALS
TEMPERATURE: 98.1 F | BODY MASS INDEX: 23.03 KG/M2 | OXYGEN SATURATION: 97 % | HEART RATE: 75 BPM | SYSTOLIC BLOOD PRESSURE: 155 MMHG | RESPIRATION RATE: 20 BRPM | DIASTOLIC BLOOD PRESSURE: 94 MMHG | WEIGHT: 155.5 LBS | HEIGHT: 69 IN

## 2020-01-17 DIAGNOSIS — R20.2 NUMBNESS AND TINGLING OF LEFT ARM AND LEG: ICD-10-CM

## 2020-01-17 DIAGNOSIS — M47.896 OTHER OSTEOARTHRITIS OF SPINE, LUMBAR REGION: ICD-10-CM

## 2020-01-17 DIAGNOSIS — R20.0 NUMBNESS AND TINGLING OF LEFT ARM AND LEG: ICD-10-CM

## 2020-01-17 DIAGNOSIS — I10 HTN, GOAL BELOW 140/90: ICD-10-CM

## 2020-01-17 DIAGNOSIS — Z72.0 TOBACCO ABUSE DISORDER: ICD-10-CM

## 2020-01-17 DIAGNOSIS — Z12.5 PROSTATE CANCER SCREENING: ICD-10-CM

## 2020-01-17 DIAGNOSIS — E78.2 MIXED HYPERLIPIDEMIA: ICD-10-CM

## 2020-01-17 DIAGNOSIS — Z12.11 SCREEN FOR COLON CANCER: ICD-10-CM

## 2020-01-17 DIAGNOSIS — Z23 ENCOUNTER FOR IMMUNIZATION: ICD-10-CM

## 2020-01-17 DIAGNOSIS — Z00.00 ROUTINE MEDICAL EXAM: Primary | ICD-10-CM

## 2020-01-17 LAB
BILIRUB UR QL STRIP: NEGATIVE
GLUCOSE UR-MCNC: NEGATIVE MG/DL
KETONES P FAST UR STRIP-MCNC: NEGATIVE MG/DL
PH UR STRIP: 6.5 [PH] (ref 4.6–8)
PROT UR QL STRIP: NEGATIVE
SP GR UR STRIP: 1.01 (ref 1–1.03)
UA UROBILINOGEN AMB POC: NORMAL (ref 0.2–1)
URINALYSIS CLARITY POC: CLEAR
URINALYSIS COLOR POC: YELLOW
URINE BLOOD POC: NORMAL
URINE LEUKOCYTES POC: NEGATIVE
URINE NITRITES POC: NEGATIVE

## 2020-01-17 RX ORDER — ROSUVASTATIN CALCIUM 10 MG/1
10 TABLET, COATED ORAL
Qty: 3 TAB | Refills: 2 | Status: SHIPPED | OUTPATIENT
Start: 2020-01-17 | End: 2020-02-03 | Stop reason: SDUPTHER

## 2020-01-17 RX ORDER — INDOMETHACIN 50 MG/1
50 CAPSULE ORAL 3 TIMES DAILY
Qty: 18 CAP | Refills: 0 | Status: SHIPPED | OUTPATIENT
Start: 2020-01-17 | End: 2020-01-23

## 2020-01-17 RX ORDER — ASPIRIN 81 MG/1
81 TABLET ORAL DAILY
Qty: 30 TAB | Refills: 11 | Status: SHIPPED | OUTPATIENT
Start: 2020-01-17 | End: 2021-03-15 | Stop reason: ALTCHOICE

## 2020-01-17 RX ORDER — METHYLPREDNISOLONE 4 MG/1
TABLET ORAL
Qty: 1 DOSE PACK | Refills: 0 | Status: SHIPPED | OUTPATIENT
Start: 2020-01-17 | End: 2020-02-03 | Stop reason: ALTCHOICE

## 2020-01-17 RX ORDER — LISINOPRIL AND HYDROCHLOROTHIAZIDE 20; 25 MG/1; MG/1
1 TABLET ORAL DAILY
Qty: 90 TAB | Refills: 1 | Status: SHIPPED | OUTPATIENT
Start: 2020-01-17 | End: 2021-02-05

## 2020-01-17 NOTE — PATIENT INSTRUCTIONS
Vaccine Information Statement    Influenza (Flu) Vaccine (Inactivated or Recombinant): What You Need to Know    Many Vaccine Information Statements are available in Maltese and other languages. See www.immunize.org/vis  Hojas de información sobre vacunas están disponibles en español y en muchos otros idiomas. Visite www.immunize.org/vis    1. Why get vaccinated? Influenza vaccine can prevent influenza (flu). Flu is a contagious disease that spreads around the [facility] every year, usually between October and May. Anyone can get the flu, but it is more dangerous for some people. Infants and young children, people 72years of age and older, pregnant women, and people with certain health conditions or a weakened immune system are at greatest risk of flu complications. Pneumonia, bronchitis, sinus infections and ear infections are examples of flu-related complications. If you have a medical condition, such as heart disease, cancer or diabetes, flu can make it worse. Flu can cause fever and chills, sore throat, muscle aches, fatigue, cough, headache, and runny or stuffy nose. Some people may have vomiting and diarrhea, though this is more common in children than adults. Each year thousands of people in the [employer] die from flu, and many more are hospitalized. Flu vaccine prevents millions of illnesses and flu-related visits to the doctor each year. 2. Influenza vaccines     CDC recommends everyone 10months of age and older get vaccinated every flu season. Children 6 months through 6years of age may need 2 doses during a single flu season. Everyone else needs only 1 dose each flu season. It takes about 2 weeks for protection to develop after vaccination. There are many flu viruses, and they are always changing. Each year a new flu vaccine is made to protect against three or four viruses that are likely to cause disease in the upcoming flu season.  Even when the vaccine doesnt exactly match these viruses, it may still provide some protection. Influenza vaccine does not cause flu. Influenza vaccine may be given at the same time as other vaccines. 3. Talk with your health care provider    Tell your vaccine provider if the person getting the vaccine:   Has had an allergic reaction after a previous dose of influenza vaccine, or has any severe, life-threatening allergies.  Has ever had Guillain-Barré Syndrome (also called GBS). In some cases, your health care provider may decide to postpone influenza vaccination to a future visit. People with minor illnesses, such as a cold, may be vaccinated. People who are moderately or severely ill should usually wait until they recover before getting influenza vaccine. Your health care provider can give you more information. 4. Risks of a reaction     Soreness, redness, and swelling where shot is given, fever, muscle aches, and headache can happen after influenza vaccine.  There may be a very small increased risk of Guillain-Barré Syndrome (GBS) after inactivated influenza vaccine (the flu shot). [place] children who get the flu shot along with pneumococcal vaccine (PCV13), and/or DTaP vaccine at the same time might be slightly more likely to have a seizure caused by fever. Tell your health care provider if a child who is getting flu vaccine has ever had a seizure. People sometimes faint after medical procedures, including vaccination. Tell your provider if you feel dizzy or have vision changes or ringing in the ears. As with any medicine, there is a very remote chance of a vaccine causing a severe allergic reaction, other serious injury, or death. 5. What if there is a serious problem? An allergic reaction could occur after the vaccinated person leaves the clinic.  If you see signs of a severe allergic reaction (hives, swelling of the face and throat, difficulty breathing, a fast heartbeat, dizziness, or weakness), call 9-1-1 and get the person to the nearest hospital.    For other signs that concern you, call your health care provider. Adverse reactions should be reported to the Vaccine Adverse Event Reporting System (VAERS). Your health care provider will usually file this report, or you can do it yourself. Visit the VAERS website at www.vaers. [facility].gov or call 6-236.657.2351. VAERS is only for reporting reactions, and VAERS staff do not give medical advice. 6. The National Vaccine Injury Compensation Program    The [facility] Vaccine Injury Compensation Program (VICP) is a federal program that was created to compensate people who may have been injured by certain vaccines. Visit the VICP website at www.[facility].gov/vaccinecompensation or call 4-516.856.9249 to learn about the program and about filing a claim. There is a time limit to file a claim for compensation. 7. How can I learn more?  Ask your health care provider.  Call your local or state health department.  Contact the Centers for Disease Control and Prevention (CDC):  - Call 8-236.213.7400 (9-056-FRA-INFO) or  - Visit CDCs influenza website at www.cdc.gov/flu    Vaccine Information Statement (Interim)  Inactivated Influenza Vaccine   8/15/2019  41 [address] [id number]   Department of Health and Human Services  Centers for Disease Control and Prevention    Office Use Only           Prostate Cancer Screening: Care Instructions  Your Care Instructions    The prostate gland is an organ found just below a man's bladder. It is the size and shape of a walnut. It surrounds the tube that carries urine from the bladder out of the body through the penis. This tube is called the urethra. Prostate cancer is the abnormal growth of cells in the prostate. It is the second most common type of cancer in men. (Skin cancer is the most common.)  Most cases of prostate cancer occur in men older than 72. The disease runs in families.  And it's more common in -American men.  When it's found and treated early, prostate cancer may be cured. But it is not always treated. This is because prostate cancer may not shorten your life, especially if you are older and the cancer is growing slowly. Follow-up care is a key part of your treatment and safety. Be sure to make and go to all appointments, and call your doctor if you are having problems. It's also a good idea to know your test results and keep a list of the medicines you take. What are the screening tests for prostate cancer? The main screening test for prostate cancer is the prostate-specific antigen (PSA) test. This is a blood test that measures how much PSA is in your blood. A high level may mean that you have an enlargement, an infection, or cancer. Along with the PSA test, you may have a digital rectal exam. The digital (finger) rectal exam checks for anything abnormal in your prostate. To do the exam, the doctor puts a lubricated, gloved finger into your rectum. If these tests suggest cancer, you may need a prostate biopsy. How is prostate cancer diagnosed? In a biopsy, the doctor takes small tissue samples from your prostate gland. Another doctor then looks at the tissue under a microscope to see if there are cancer cells, signs of infection, or other problems. The results help diagnose prostate cancer. What are the pros and cons of screening? Neither a PSA test nor a digital rectal exam can tell you for sure that you do or do not have cancer. But they can help you decide if you need more tests, such as a prostate biopsy. Screening tests may be useful because most men with prostate cancer don't have symptoms. It can be hard to know if you have cancer until it is more advanced. And then it's harder to treat. But having a PSA test can also cause harm. The test may show high levels of PSA that aren't caused by cancer. So you could have a prostate biopsy you didn't need.  Or the PSA test might be normal when there is cancer, so a cancer might not be found early. The test can also find cancers that would never have caused a problem during your lifetime. So you might have treatment that was not needed. Prostate cancer usually develops late in life and grows slowly. For many men, it does not shorten their lives. Some experts advise screening only for men who are at high risk. Talk with your doctor to see if screening is right for you. Where can you learn more? Go to http://raiza-stanley.info/. Enter R550 in the search box to learn more about \"Prostate Cancer Screening: Care Instructions. \"  Current as of: December 19, 2018  Content Version: 12.2  © 7532-0457 [employer]. Care instructions adapted under license by [employer] (which disclaims liability or warranty for this information). If you have questions about a medical condition or this instruction, always ask your healthcare professional. [place] [address] any warranty or liability for your use of this information. Colon Cancer Screening: Care Instructions  Your Care Instructions    Colorectal cancer occurs in the colon or rectum. That's the lower part of your digestive system. It is the second-leading cause of cancer deaths in the [place]. It often starts with small growths called polyps in the colon or rectum. Polyps are usually found with screening tests. Depending on the type of test, any polyps found may be removed during the tests. Colorectal cancer usually does not cause symptoms at first. But regular tests can help find it early, before it spreads and becomes harder to treat. Experts advise routine tests for colon cancer for people starting at age 48. And they advise people with a higher risk of colon cancer to get tested sooner. Talk with your doctor about when you should start testing. Discuss which tests you need. Follow-up care is a key part of your treatment and safety.  Be sure to make and go to all appointments, and call your doctor if you are having problems. It's also a good idea to know your test results and keep a list of the medicines you take. What are the main screening tests for colon cancer? · Stool tests. These include the fecal immunochemical test (FIT) and the fecal occult blood test (FOBT). These tests check stool samples for signs of cancer. If your test is positive, you will need to have a colonoscopy. · Sigmoidoscopy. This test lets your doctor look at the lining of your rectum and the lowest part of your colon. Your doctor uses a lighted tube called a sigmoidoscope. This test can't find cancers or polyps in the upper part of your colon. In some cases, polyps that are found can be removed. But if your doctor finds polyps, you will need to have a colonoscopy to check the upper part of your colon. · Colonoscopy. This test lets your doctor look at the lining of your rectum and your entire colon. The doctor uses a thin, flexible tool called a colonoscope. It can also be used to remove polyps or get a tissue sample (biopsy). What tests do you need? The following guidelines are for people age 48 and over who are not at high risk for colorectal cancer. You may have at least one of these tests as directed by your doctor. · Fecal immunochemical test (FIT) or fecal occult blood test (FOBT) every year  · Sigmoidoscopy every 5 years  · Colonoscopy every 10 years  If you are age 68 to 80, you can work with your doctor to decide if screening is a good option. If you are age 80 or older, your doctor will likely advise that screening is not helpful. Talk with your doctor about when you need to be tested. And discuss which tests are right for you. Your doctor may recommend earlier or more frequent testing if you:  · Have had colorectal cancer before. · Have had colon polyps. · Have symptoms of colorectal cancer.  These include blood in your stool and changes in your bowel habits. · Have a parent, brother or sister, or child with colon polyps or colorectal cancer. · Have a bowel disease. This includes ulcerative colitis and Crohn's disease. · Have a rare polyp syndrome that runs in families, such as familial adenomatous polyposis (FAP). · Have had radiation treatments to the belly or pelvis. When should you call for help? Watch closely for changes in your health, and be sure to contact your doctor if:    · You have any changes in your bowel habits.     · You have any problems. Where can you learn more? Go to http://raiza-stanley.info/. Enter M541 in the search box to learn more about \"Colon Cancer Screening: Care Instructions. \"  Current as of: March 28, 2018  Content Version: 11.8  © 6035-7047 [employer]. Care instructions adapted under license by "[employer] (which disclaims liability or warranty for this information). If you have questions about a medical condition or this instruction, always ask your healthcare professional. [place] [address] any warranty or liability for your use of this information.

## 2020-01-17 NOTE — PROGRESS NOTES
Subjective:   Maddi Garibay is a 53 y.o. male presenting for his annual checkup. Present for CPE, with chronic hep-c treated recently and undetectable, DJD of lower spine and b/l knee pain, last Complete Physical exam was 2019 Up todate w/ all vaccination, last tetanus vaccine was in 2013, Required Immunizations including flu, PNA, dTap r/w'd not uptodate     Last prostate exam was never  last colonoscopy was > 2yrs ago  No past surgical hx,   last bone dexa scan was abnormal wit osteopenia and was 5 yr  Ago,   No family hx of breast cancer   Father and mother  from old age no family hx of colon cancer, , trying to be sexaully active unable to do so,  +++ physically active,  compliant w/ meds, +++Rf needed for today for his meds. Patient has good supportive care at home, and feels safe no physical mental abuse,  Medications causing fall and the risk for future fall screened specially if the continuation of some of the current meds continues is addressed,  the depression at this age addressed pt with improvig interests and enjoy to do things, not anxious not depressed, not wanted to heart self or others  pt at this visit, is physically functional with gait nl and nicely independent and walks w/out mobility device and, in addition mentaly is functional,  very Alert and oriented ,    BMI for the pt's age the nl BMI r/w'd and information given, bp was screened for abnormality,      Constitutional: no chills and fever, nad     HENT: no ear head pain and nosebleeds. No blurred vision, pain and discharge. Respiratory: no shortness of breath, wheezing cough nor sore throat. Cardiovascular: Has no chest pain, leg swelling ,and racing heart . Gastrointestinal: No constipation, diarrhea, nausea and vomiting. Genitourinary: No frequency. Musculoskeletal: Negative for joint pain. Skin: no itching, pimples or acne rash.    Neurological: Negative for dizziness, no tremors  Psychiatric/Behavioral: Negative for depression has normal interest to do things and not depressed the patient is not nervous/anxious. Specific concerns today:     HTN  Today pt present for Bp check and++= Compliancy w/ the bp meds, having had the low salt diet ,  has been active, patien does not obtain the bp at home ,, today the pt denies Chest Pain, has no legs swelling no lightheadedness,  Unfortunately  with tobacco abuse stating that the pt is all stressed out, the tobacco use helps it, took meds for it, no s no h ideation, the pt has slowed down but not ready to quit at this time unable to afford the medication the pt likes to do it on its own, pt has been offered alternatives but states not yet,   For 25 yrs, 1ppd,     Current Outpatient Medications   Medication Sig Dispense Refill    lisinopril-hydroCHLOROthiazide (PRINZIDE, ZESTORETIC) 10-12.5 mg per tablet Take 1 Tab by mouth daily. 90 Tab 3    famotidine (PEPCID) 40 mg tablet Take 1 Tab by mouth two (2) times a day. 30 Tab 0    metoclopramide HCl (REGLAN) 10 mg tablet Take 1 Tab by mouth Before breakfast, lunch, and dinner. 12 Tab 0    busPIRone (BUSPAR) 7.5 mg tablet Take 7.5 mg by mouth three (3) times daily.        Allergies   Allergen Reactions    Bees [Hymenoptera Allergenic Extract] Hives    Codeine Hives     Past Medical History:   Diagnosis Date    Arthritis     Chronic pain     Depression     Gastrointestinal disorder     pancreatitis    Gastrointestinal disorder     mass    Hepatitis C     2018    Hypertension      Past Surgical History:   Procedure Laterality Date    HX COLONOSCOPY       Family History   Problem Relation Age of Onset    Cancer Mother     Heart Disease Mother     Arthritis-osteo Father     Heart Disease Father     Migraines Sister      Social History     Tobacco Use    Smoking status: Current Every Day Smoker     Packs/day: 1.00     Years: 30.00     Pack years: 30.00     Types: Cigarettes Start date: 1/17/1990    Smokeless tobacco: Never Used   Substance Use Topics    Alcohol use: Yes     Alcohol/week: 10.0 standard drinks     Types: 12 Cans of beer per week     Comment: 20/week        Lab Results   Component Value Date/Time    Glucose 100 09/05/2019 01:47 AM    Creatinine 0.82 09/05/2019 01:47 AM      No results found for: CHOL, CHOLPOCT, HDL, LDL, LDLC, LDLCPOC, LDLCEXT, TRIGL, TGLPOCT, CHHD, CHHDX     Objective:     Visit Vitals  BP (!) 151/91 (BP 1 Location: Left arm, BP Patient Position: At rest)   Pulse 75   Temp 98.1 °F (36.7 °C) (Oral)   Resp 20   Ht 5' 8.7\" (1.745 m)   Wt 155 lb 8 oz (70.5 kg)   SpO2 97%   BMI 23.16 kg/m²     The patient appears well, alert, oriented x 3, in no distress. ENT normal.  Neck supple. No adenopathy or thyromegaly. PUNEET. Lungs are clear, good air entry, no wheezes, rhonchi or rales. S1 and S2 normal, no murmurs, regular rate and rhythm. Abdomen is soft without tenderness, guarding, mass or organomegaly.  exam: no penile lesions or discharge, no testicular masses or tenderness, no hernias. Extremities show no edema, normal peripheral pulses. Neurological is normal without focal findings. Assessment/Plan:   healthy adult male      Diagnoses and all orders for this visit:    1. Routine medical exam    2. Encounter for immunization  -     INFLUENZA VIRUS VAC QUAD,SPLIT,PRESV FREE SYRINGE IM  -     NH IMMUNIZ ADMIN,1 SINGLE/COMB VAC/TOXOID  -     PNEUMOCOCCAL POLYSACCHARIDE VACCINE, 23-VALENT, ADULT OR IMMUNOSUPPRESSED PT DOSE,    3. Screen for colon cancer    4. Prostate cancer screening  -     PSA, DIAGNOSTIC (PROSTATE SPECIFIC AG)    5.  Mixed hyperlipidemia  -     CBC W/O DIFF  -     METABOLIC PANEL, COMPREHENSIVE  -     LIPID PANEL  -     HEMOGLOBIN A1C WITH EAG  -     VITAMIN D, 25 HYDROXY  -     VITAMIN B12 & FOLATE  -     TSH 3RD GENERATION  -     AMB POC URINALYSIS DIP STICK AUTO W/O MICRO  -     lisinopril-hydroCHLOROthiazide (Neyda Ko) 20-25 mg per tablet; Take 1 Tab by mouth daily. -     aspirin delayed-release 81 mg tablet; Take 1 Tab by mouth daily. -     rosuvastatin (CRESTOR) 10 mg tablet; Take 1 Tab by mouth nightly. 6. HTN, goal below 140/90  -     CBC W/O DIFF  -     METABOLIC PANEL, COMPREHENSIVE  -     LIPID PANEL  -     HEMOGLOBIN A1C WITH EAG  -     VITAMIN D, 25 HYDROXY  -     VITAMIN B12 & FOLATE  -     TSH 3RD GENERATION  -     AMB POC URINALYSIS DIP STICK AUTO W/O MICRO  -     lisinopril-hydroCHLOROthiazide (PRINZIDE, ZESTORETIC) 20-25 mg per tablet; Take 1 Tab by mouth daily. -     aspirin delayed-release 81 mg tablet; Take 1 Tab by mouth daily. -     rosuvastatin (CRESTOR) 10 mg tablet; Take 1 Tab by mouth nightly. 7. Other osteoarthritis of spine, lumbar region  -     VITAMIN B12 & FOLATE  -     REFERRAL TO PHYSICAL THERAPY    8. Numbness and tingling of left arm and leg  -     REFERRAL TO NEUROLOGY  -     indomethacin (INDOCIN) 50 mg capsule; Take 1 Cap by mouth three (3) times daily for 6 days. -     methylPREDNISolone (MEDROL DOSEPACK) 4 mg tablet; As directed for 6 days one package    9. Tobacco abuse disorder  -     CT LOW DOSE LUNG CANCER SCREENING; Future    increase physical activity, limit alcohol consumption, stop smoking (advice and handout given), follow low fat diet, follow low salt diet, continue present plan, routine labs ordered, call if any problems.

## 2020-01-17 NOTE — PROGRESS NOTES
Name and  verified        Chief Complaint   Patient presents with   [address] Patient         Patient stated changing PCP due to location. Order placed for flu vaccine, per Verbal Order from Dr. Samantha Sparks  on 2020 due to HM.

## 2020-01-17 NOTE — PROGRESS NOTES
After obtaining consent, and per orders of , flu vaccine given to  Left deltoid IM  . Patient instructed to remain in clinic for 15 minutes afterwards, and to report any adverse reaction to me immediately. Patient did not have any adverse reactions during this office visit    After obtaining consent, and per orders of , pneumo 23 vaccine  given to  Right deltoid IM  . Patient instructed to remain in clinic for 15 minutes afterwards, and to report any adverse reaction to me immediately.  Patient did not have any adverse reactions during this office visit

## 2020-01-18 LAB
25(OH)D3+25(OH)D2 SERPL-MCNC: 14.1 NG/ML (ref 30–100)
ALBUMIN SERPL-MCNC: 5.4 G/DL (ref 3.5–5.5)
ALBUMIN/GLOB SERPL: 2.2 {RATIO} (ref 1.2–2.2)
ALP SERPL-CCNC: 80 IU/L (ref 39–117)
ALT SERPL-CCNC: 16 IU/L (ref 0–44)
AST SERPL-CCNC: 22 IU/L (ref 0–40)
BILIRUB SERPL-MCNC: 0.7 MG/DL (ref 0–1.2)
BUN SERPL-MCNC: 9 MG/DL (ref 6–24)
BUN/CREAT SERPL: 10 (ref 9–20)
CALCIUM SERPL-MCNC: 10.3 MG/DL (ref 8.7–10.2)
CHLORIDE SERPL-SCNC: 96 MMOL/L (ref 96–106)
CHOLEST SERPL-MCNC: 217 MG/DL (ref 100–199)
CO2 SERPL-SCNC: 24 MMOL/L (ref 20–29)
CREAT SERPL-MCNC: 0.87 MG/DL (ref 0.76–1.27)
ERYTHROCYTE [DISTWIDTH] IN BLOOD BY AUTOMATED COUNT: 12.9 % (ref 11.6–15.4)
EST. AVERAGE GLUCOSE BLD GHB EST-MCNC: 103 MG/DL
FOLATE SERPL-MCNC: 9.3 NG/ML
GLOBULIN SER CALC-MCNC: 2.5 G/DL (ref 1.5–4.5)
GLUCOSE SERPL-MCNC: 91 MG/DL (ref 65–99)
HBA1C MFR BLD: 5.2 % (ref 4.8–5.6)
HCT VFR BLD AUTO: 45.3 % (ref 37.5–51)
HDLC SERPL-MCNC: 87 MG/DL
HGB BLD-MCNC: 16.3 G/DL (ref 13–17.7)
LDLC SERPL CALC-MCNC: 113 MG/DL (ref 0–99)
MCH RBC QN AUTO: 32.7 PG (ref 26.6–33)
MCHC RBC AUTO-ENTMCNC: 36 G/DL (ref 31.5–35.7)
MCV RBC AUTO: 91 FL (ref 79–97)
PLATELET # BLD AUTO: 291 X10E3/UL (ref 150–450)
POTASSIUM SERPL-SCNC: 4.5 MMOL/L (ref 3.5–5.2)
PROT SERPL-MCNC: 7.9 G/DL (ref 6–8.5)
PSA SERPL-MCNC: 1 NG/ML (ref 0–4)
RBC # BLD AUTO: 4.99 X10E6/UL (ref 4.14–5.8)
SODIUM SERPL-SCNC: 137 MMOL/L (ref 134–144)
TRIGL SERPL-MCNC: 83 MG/DL (ref 0–149)
TSH SERPL DL<=0.005 MIU/L-ACNC: 1.85 UIU/ML (ref 0.45–4.5)
VIT B12 SERPL-MCNC: 521 PG/ML (ref 232–1245)
VLDLC SERPL CALC-MCNC: 17 MG/DL (ref 5–40)
WBC # BLD AUTO: 8.2 X10E3/UL (ref 3.4–10.8)

## 2020-02-03 ENCOUNTER — OFFICE VISIT (OUTPATIENT)
Dept: FAMILY MEDICINE CLINIC | Age: 55
End: 2020-02-03

## 2020-02-03 VITALS
HEART RATE: 77 BPM | WEIGHT: 157.9 LBS | RESPIRATION RATE: 20 BRPM | BODY MASS INDEX: 23.39 KG/M2 | SYSTOLIC BLOOD PRESSURE: 140 MMHG | DIASTOLIC BLOOD PRESSURE: 82 MMHG | TEMPERATURE: 96.9 F | HEIGHT: 69 IN | OXYGEN SATURATION: 97 %

## 2020-02-03 DIAGNOSIS — Z11.59 NEED FOR HEPATITIS C SCREENING TEST: Primary | ICD-10-CM

## 2020-02-03 DIAGNOSIS — E78.2 MIXED HYPERLIPIDEMIA: ICD-10-CM

## 2020-02-03 DIAGNOSIS — R20.2 NUMBNESS AND TINGLING OF LEFT ARM AND LEG: ICD-10-CM

## 2020-02-03 DIAGNOSIS — G89.29 CHRONIC BILATERAL LOW BACK PAIN WITHOUT SCIATICA: ICD-10-CM

## 2020-02-03 DIAGNOSIS — N52.1 ERECTILE DYSFUNCTION DUE TO DISEASES CLASSIFIED ELSEWHERE: ICD-10-CM

## 2020-02-03 DIAGNOSIS — I10 HTN, GOAL BELOW 140/90: ICD-10-CM

## 2020-02-03 DIAGNOSIS — Z72.0 TOBACCO ABUSE DISORDER: ICD-10-CM

## 2020-02-03 DIAGNOSIS — Z23 ENCOUNTER FOR IMMUNIZATION: ICD-10-CM

## 2020-02-03 DIAGNOSIS — E55.9 VITAMIN D DEFICIENCY: ICD-10-CM

## 2020-02-03 DIAGNOSIS — R20.0 NUMBNESS AND TINGLING OF LEFT ARM AND LEG: ICD-10-CM

## 2020-02-03 DIAGNOSIS — M54.50 CHRONIC BILATERAL LOW BACK PAIN WITHOUT SCIATICA: ICD-10-CM

## 2020-02-03 RX ORDER — ERGOCALCIFEROL 1.25 MG/1
50000 CAPSULE ORAL
Qty: 4 CAP | Refills: 11 | Status: SHIPPED | OUTPATIENT
Start: 2020-02-03 | End: 2021-03-11 | Stop reason: SDUPTHER

## 2020-02-03 RX ORDER — SILDENAFIL 100 MG/1
100 TABLET, FILM COATED ORAL AS NEEDED
Qty: 12 TAB | Refills: 2 | Status: SHIPPED | OUTPATIENT
Start: 2020-02-03 | End: 2021-03-15 | Stop reason: ALTCHOICE

## 2020-02-03 RX ORDER — ROSUVASTATIN CALCIUM 10 MG/1
10 TABLET, COATED ORAL
Qty: 90 TAB | Refills: 5 | Status: SHIPPED | OUTPATIENT
Start: 2020-02-03 | End: 2021-03-11 | Stop reason: SDUPTHER

## 2020-02-03 NOTE — PROGRESS NOTES
Name and  verified      Chief Complaint   Patient presents with    Hypertension     Follow up         Health Maintenance reviewed-discussed with patient. 1. Have you been to the ER, urgent care clinic since your last visit? Hospitalized since your last visit? no     2. Have you seen or consulted any other health care providers outside of the [address] [place] [address] since your last visit? Include any pap smears or colon screening. no    Blood pressure elevated. Dr [place] notified. Will recheck blood pressure 140/82. none

## 2020-02-03 NOTE — PATIENT INSTRUCTIONS
Low Back Pain: Exercises  Introduction  Here are some examples of exercises for you to try. The exercises may be suggested for a condition or for rehabilitation. Start each exercise slowly. Ease off the exercises if you start to have pain. You will be told when to start these exercises and which one     Stopping Smokeless Tobacco Use: Care Instructions  Your Care Instructions    Smokeless tobacco comes in many forms, such as snuff and chewing tobacco:  · Snuff is finely ground tobacco sold in cans or pouches. Most of the time, snuff is used by putting a \"pinch\" or \"dip\" between the lower lip or cheek and the gum. · Chewing tobacco is sold as loose leaves, plugs, or twists. It is chewed or placed between the cheek and the gum or teeth. There are plenty of reasons to stop using smokeless tobacco. These products are harmful. They are not risk-free alternatives to smoking. Smokeless tobacco contains nicotine, which is addicting. Though using smokeless tobacco is less harmful than smoking cigarettes, it can cause serious health problems, such as:  · White patches or red sores in your mouth that can turn into mouth cancer involving the lip, tongue, or cheek. · Tooth loss and other dental problems. · Gum disease. Your gums may pull away from your teeth and not grow back. People who use smokeless tobacco crave the nicotine in it. Giving up smokeless tobacco is much harder than simply changing a habit. Your body has to stop craving the nicotine. It is hard to quit, but you can do it. Many tools are available for people who want to quit using smokeless tobacco. You may find that combining tools works best for you. There are several steps to quitting. First you get ready to quit. Then you get support to help you. After that, you learn new skills and behaviors to quit. For many people, a necessary step is getting and using medicine. Your doctor will help you set up the plan that best meets your needs.  You may want to attend a tobacco cessation program. When you choose a program, look for one that has proven success. Ask your doctor for ideas. You will greatly increase your chances of success if you take medicine as well as get counseling or join a cessation program.  Some of the changes you feel when you first quit smokeless tobacco are uncomfortable. Your body will miss the nicotine at first, and you may feel short-tempered and grumpy. You may have trouble sleeping or concentrating. Medicine can help you deal with these symptoms. You may struggle with changing your habits and rituals. The last step is the tricky one: Be prepared for the urge to use smokeless tobacco to continue for a time. This is a lot to deal with, but keep at it. You will feel better. Follow-up care is a key part of your treatment and safety. Be sure to make and go to all appointments, and call your doctor if you are having problems. It's also a good idea to know your test results and keep a list of the medicines you take. How can you care for yourself at home? · Ask your family, friends, and coworkers for support. You have a better chance of quitting if you have help and support. · Join a support group for people who are trying to quit using smokeless tobacco.  · Set a quit date. Pick your date carefully so that it is not right in the middle of a big deadline or stressful time. After you quit, do not use smokeless tobacco even once. Get rid of all spit cups, cans, and pouches after your last use. Clean your house and your clothes so that they do not smell of tobacco.  · Learn how to be a non-user. Think about ways you can avoid those things that make you reach for tobacco.  ? Learn some ways to deal with cravings, like calling a friend or going for a walk. Cravings often pass. ? Avoid situations that put you at greatest risk for using smokeless tobacco. For some people, it is hard to spend time with friends without dipping or chewing.  For others, they might skip a coffee break with coworkers who smoke or use smokeless tobacco.  ? Change your daily routine. Take a different route to work, or eat a meal in a different place. · Cut down on stress. Calm yourself or release tension by doing an activity you enjoy, such as reading a book, taking a hot bath, or gardening. · Talk to your doctor or pharmacist about nicotine replacement therapy. You still get nicotine, but you do not use tobacco. Nicotine replacement products help you slowly reduce the amount of nicotine you need. Many of these products are available over the counter. They include nicotine patches, gum, lozenges, and inhalers. · Ask your doctor about bupropion (Wellbutrin) or varenicline (Chantix), which are prescription medicines. They do not contain nicotine. They help you by reducing withdrawal symptoms, such as stress and anxiety. · Get regular exercise. Having healthy habits will help your body move past its craving for nicotine. · Be prepared to keep trying. Most people are not successful the first few times they try to quit. Do not get mad at yourself if you use tobacco again. Make a list of things you learned, and think about when you want to try again, such as next week, next month, or next year. Where can you learn more? Go to http://raiza-stanley.info/. Enter T846 in the search box to learn more about \"Stopping Smokeless Tobacco Use: Care Instructions. \"  Current as of: September 26, 2018  Content Version: 12.2  © 7069-9647 [employer]. Care instructions adapted under license by [employer] (which disclaims liability or warranty for this information). If you have questions about a medical condition or this instruction, always ask your healthcare professional. [place] [id number] any warranty or liability for your use of this information. will work best for you. How to do the exercises  Press-up    1.  Lie on your stomach, supporting your body with your forearms. 2. Press your elbows down into the floor to raise your upper back. As you do this, relax your stomach muscles and allow your back to arch without using your back muscles. As your press up, do not let your hips or pelvis come off the floor. 3. Hold for 15 to 30 seconds, then relax. 4. Repeat 2 to 4 times. Alternate arm and leg (bird dog) exercise    1. Start on the floor, on your hands and knees. 2. Tighten your belly muscles. 3. Raise one leg off the floor, and hold it straight out behind you. Be careful not to let your hip drop down, because that will twist your trunk. 4. Hold for about 6 seconds, then lower your leg and switch to the other leg. 5. Repeat 8 to 12 times on each leg. 6. Over time, work up to holding for 10 to 30 seconds each time. 7. If you feel stable and secure with your leg raised, try raising the opposite arm straight out in front of you at the same time. Knee-to-chest exercise    1. Lie on your back with your knees bent and your feet flat on the floor. 2. Bring one knee to your chest, keeping the other foot flat on the floor (or keeping the other leg straight, whichever feels better on your lower back). 3. Keep your lower back pressed to the floor. Hold for at least 15 to 30 seconds. 4. Relax, and lower the knee to the starting position. 5. Repeat with the other leg. Repeat 2 to 4 times with each leg. 6. To get more stretch, put your other leg flat on the floor while pulling your knee to your chest.    Curl-ups    1. Lie on the floor on your back with your knees bent at a 90-degree angle. Your feet should be flat on the floor, about 12 inches from your buttocks. 2. Cross your arms over your chest. If this bothers your neck, try putting your hands behind your neck (not your head), with your elbows spread apart. 3. Slowly tighten your belly muscles and raise your shoulder blades off the floor.   4. Keep your head in line with your body, and do not press your chin to your chest.  5. Hold this position for 1 or 2 seconds, then slowly lower yourself back down to the floor. 6. Repeat 8 to 12 times. Pelvic tilt exercise    1. Lie on your back with your knees bent. 2. \"Brace\" your stomach. This means to tighten your muscles by pulling in and imagining your belly button moving toward your spine. You should feel like your back is pressing to the floor and your hips and pelvis are rocking back. 3. Hold for about 6 seconds while you breathe smoothly. 4. Repeat 8 to 12 times. Heel dig bridging    1. Lie on your back with both knees bent and your ankles bent so that only your heels are digging into the floor. Your knees should be bent about 90 degrees. 2. Then push your heels into the floor, squeeze your buttocks, and lift your hips off the floor until your shoulders, hips, and knees are all in a straight line. 3. Hold for about 6 seconds as you continue to breathe normally, and then slowly lower your hips back down to the floor and rest for up to 10 seconds. 4. Do 8 to 12 repetitions. Hamstring stretch in doorway    1. Lie on your back in a doorway, with one leg through the open door. 2. Slide your leg up the wall to straighten your knee. You should feel a gentle stretch down the back of your leg. 3. Hold the stretch for at least 15 to 30 seconds. Do not arch your back, point your toes, or bend either knee. Keep one heel touching the floor and the other heel touching the wall. 4. Repeat with your other leg. 5. Do 2 to 4 times for each leg. Hip flexor stretch    1. Kneel on the floor with one knee bent and one leg behind you. Place your forward knee over your foot. Keep your other knee touching the floor. 2. Slowly push your hips forward until you feel a stretch in the upper thigh of your rear leg. 3. Hold the stretch for at least 15 to 30 seconds. Repeat with your other leg. 4. Do 2 to 4 times on each side. Wall sit    1.  Stand with your back 10 to 12 inches away from a wall. 2. Lean into the wall until your back is flat against it. 3. Slowly slide down until your knees are slightly bent, pressing your lower back into the wall. 4. Hold for about 6 seconds, then slide back up the wall. 5. Repeat 8 to 12 times. Follow-up care is a key part of your treatment and safety. Be sure to make and go to all appointments, and call your doctor if you are having problems. It's also a good idea to know your test results and keep a list of the medicines you take. Where can you learn more? Go to http://raiza-stanley.info/. Enter L029 in the search box to learn more about \"Low Back Pain: Exercises. \"  Current as of: June 26, 2019  Content Version: 12.2  © 0612-2495 [employer]. Care instructions adapted under license by [employer] (which disclaims liability or warranty for this information). If you have questions about a medical condition or this instruction, always ask your healthcare professional. [address] any warranty or liability for your use of this information. Erectile Dysfunction: Care Instructions  Your Care Instructions    A man has erectile dysfunction (ED) when he routinely can't get or keep an erection that allows satisfactory sex. He may not be able to have an erection at any time. Or he may not be able to have one that is firm enough or lasts long enough to complete intercourse. ED is not the same as having trouble getting an erection now and then. That's common. It happens to most men at some time. ED can be caused by problems with the blood vessels, nerves, or hormones. It can be caused by diabetes, heart disease, and injuries. Nerve disorders, such as multiple sclerosis or Parkinson's disease, can also cause it.   ED can also be caused by medicines, alcohol, and tobacco. Or it may be caused by depression, stress, grief, or relationship problems. Follow-up care is a key part of your treatment and safety. Be sure to make and go to all appointments, and call your doctor if you are having problems. It's also a good idea to know your test results and keep a list of the medicines you take. How can you care for yourself at home?   Lifestyle    · Limit alcohol. Have no more than 2 drinks a day.     · Do not smoke. Smoking makes it harder for the blood vessels in the penis to relax and let blood flow in. If you need help quitting, talk to your doctor about stop-smoking programs and medicines. These can increase your chances of quitting for good.     · Do not use cocaine, heroin, or other illegal drugs.     · Try to reduce stress.     · Give yourself time to adjust to change. Changes in your job, family, relationships, home life, and other areas can cause stress. And stress can cause erection problems.    Work with your partner    · Don't assume that you know what your partner likes when it comes to sex. You may be wrong. Talk about what each of you does and does not enjoy.     · Make time outside of the bedroom to talk about your sex life. If you avoid sex because you are afraid of having erection problems, your partner may worry that you are no longer interested.     · If you and your partner have trouble talking about sex, see a therapist who can help you talk about it. Reading books with your partner about sexual health may also help.     · Relax. Take time for more foreplay. Worrying about your erections may only make things worse. Medicines    · Tell your doctor about all the medicines that you take. ? Some medicines can cause erection problems. ? Some medicines can have dangerous interactions with medicines that are prescribed for ED, including over-the-counter medicines and herbal products.     · Be safe with medicines. Take your medicines exactly as prescribed.  Call your doctor if you think you are having a problem with your medicine.     · Talk to your doctor about trying a medicine to help you keep an erection. This could be a medicine such as Viagra, Levitra, or Cialis. If you have a heart problem, ask your doctor if these are safe for you. Do not take these medicines if you take nitroglycerin or other nitrate medicine. When should you call for help? Call your doctor now or seek immediate medical care if:    · You took a medicine for erectile dysfunction and you have an erection that lasts longer than 3 hours.    Watch closely for changes in your health, and be sure to contact your doctor if you have any problems. Where can you learn more? Go to http://raiza-stanley.info/. Enter [id number] [id number] [id number] in the search box to learn more about \"Erectile Dysfunction: Care Instructions. \"  Current as of: May 28, 2019  Content Version: 12.2  © 5227-1107 [employer]. Care instructions adapted under license by [employer] (which disclaims liability or warranty for this information). If you have questions about a medical condition or this instruction, always ask your healthcare professional. [address] any warranty or liability for your use of this information.

## 2020-02-04 ENCOUNTER — HOSPITAL ENCOUNTER (OUTPATIENT)
Dept: CT IMAGING | Age: 55
Discharge: HOME | End: 2020-02-04
Attending: FAMILY MEDICINE
Payer: COMMERCIAL

## 2020-02-04 DIAGNOSIS — Z72.0 TOBACCO ABUSE DISORDER: ICD-10-CM

## 2020-02-04 PROCEDURE — G0297 LDCT FOR LUNG CA SCREEN: HCPCS

## 2020-02-18 PROBLEM — E78.00 HIGH CHOLESTEROL: Status: ACTIVE | Noted: 2020-02-18

## 2020-02-18 PROBLEM — I10 ESSENTIAL HYPERTENSION: Status: ACTIVE | Noted: 2020-02-18

## 2020-08-03 ENCOUNTER — VIRTUAL VISIT (OUTPATIENT)
Dept: FAMILY MEDICINE CLINIC | Age: 55
End: 2020-08-03
Payer: COMMERCIAL

## 2020-08-03 DIAGNOSIS — Z71.89 ADVICE GIVEN ABOUT COVID-19 VIRUS INFECTION: ICD-10-CM

## 2020-08-03 DIAGNOSIS — D69.2 PURPURA (HCC): Primary | ICD-10-CM

## 2020-08-03 DIAGNOSIS — I10 HTN, GOAL BELOW 140/90: ICD-10-CM

## 2020-08-03 PROCEDURE — 99213 OFFICE O/P EST LOW 20 MIN: CPT | Performed by: FAMILY MEDICINE

## 2020-08-03 RX ORDER — BETAMETHASONE DIPROPIONATE 0.5 MG/G
CREAM TOPICAL 2 TIMES DAILY
Qty: 45 G | Refills: 1 | Status: SHIPPED | OUTPATIENT
Start: 2020-08-03 | End: 2021-03-15 | Stop reason: ALTCHOICE

## 2020-08-03 NOTE — PROGRESS NOTES
Chief Complaint   Patient presents with    Hypertension     1. Have you been to the ER, urgent care clinic since your last visit? Hospitalized since your last visit? No    2. Have you seen or consulted any other health care providers outside of the [address] [place] [address] since your last visit? Include any pap smears or colon screening. No    Call placed to pt. Verified patient with two type of identifiers. c/o fine red  rash to lower legs after being exposed to sun,  Denies itching.

## 2020-08-03 NOTE — PROGRESS NOTES
HISTORY OF PRESENT ILLNESS  Maria Dolores River is a 53 y.o. male. HPI    Today's Pt main concerns were provided on virtual visit and Video telemed format, Pt Have been staying at home for couple of wks,  pt has no fever no cough no dyspnea, no ha, not dizzy, nl smell nl taste, no N/V/D, no body ache. HTN  Today pt present for Bp check and++Compliancy w/ the bp meds, having had the low salt diet ,  has been active, patien does not obtain the bp at home ,, today the pt denies Chest Pain, has no legs swelling no lightheadedness,    Rash of the both legs, after working yard work,   Started couple weeks ago not better tried alcohol washing and OTC antibiotic ointments, does not tingles and not pain full, states that is not expanding red, and not swelled up,  Without itchiness     Current Outpatient Medications   Medication Sig Dispense Refill    rosuvastatin (CRESTOR) 10 mg tablet Take 1 Tab by mouth nightly. 90 Tab 5    ergocalciferol (ERGOCALCIFEROL) 1,250 mcg (50,000 unit) capsule Take 1 Cap by mouth every seven (7) days. 4 Cap 11    lisinopril-hydroCHLOROthiazide (PRINZIDE, ZESTORETIC) 20-25 mg per tablet Take 1 Tab by mouth daily. 90 Tab 1    aspirin delayed-release 81 mg tablet Take 1 Tab by mouth daily. 30 Tab 11    sildenafil citrate (VIAGRA) 100 mg tablet Take 1 Tab by mouth as needed for Erectile Dysfunction.  Indications: the inability to have an erection (Patient not taking: Reported on 8/3/2020) 12 Tab 2     Allergies   Allergen Reactions    Bees [Hymenoptera Allergenic Extract] Hives    Codeine Hives     Past Medical History:   Diagnosis Date    Arthritis     Chronic pain     Depression     Gastrointestinal disorder     pancreatitis    Gastrointestinal disorder     mass    Hepatitis C     2018    Hypertension      Past Surgical History:   Procedure Laterality Date    HX COLONOSCOPY       Family History   Problem Relation Age of Onset    Cancer Mother     Heart Disease Mother    Xavi Knight Arthritis-osteo Father     Heart Disease Father     Migraines Sister      Social History     Tobacco Use    Smoking status: Current Every Day Smoker     Packs/day: 1.00     Years: 30.00     Pack years: 30.00     Types: Cigarettes     Start date: 1/17/1990    Smokeless tobacco: Never Used   Substance Use Topics    Alcohol use: Yes     Alcohol/week: 10.0 standard drinks     Types: 12 Cans of beer per week     Comment: 20/week      Lab Results   Component Value Date/Time    Hemoglobin A1c 5.2 01/17/2020 10:58 AM    Glucose 91 01/17/2020 10:58 AM    LDL, calculated 113 (H) 01/17/2020 10:58 AM    Creatinine 0.87 01/17/2020 10:58 AM      Lab Results   Component Value Date/Time    Cholesterol, total 217 (H) 01/17/2020 10:58 AM    HDL Cholesterol 87 01/17/2020 10:58 AM    LDL, calculated 113 (H) 01/17/2020 10:58 AM    Triglyceride 83 01/17/2020 10:58 AM        Review of Systems   Constitutional: Negative for chills and fever. HENT: Negative for congestion and nosebleeds. Eyes: Negative for blurred vision and pain. Respiratory: Negative for cough, shortness of breath and wheezing. Cardiovascular: Negative for chest pain and leg swelling. Gastrointestinal: Negative for constipation, diarrhea, nausea and vomiting. Genitourinary: Negative for dysuria and frequency. Musculoskeletal: Negative for joint pain and myalgias. Skin: Positive for itching and rash. Neurological: Negative for dizziness, loss of consciousness and headaches. Psychiatric/Behavioral: Negative for depression. The patient is not nervous/anxious and does not have insomnia. Physical Exam  Constitutional:       Appearance: Normal appearance. HENT:      Head: Normocephalic and atraumatic. Nose: Nose normal. No congestion. Skin:     Findings: Erythema and rash present. Neurological:      Mental Status: He is alert and oriented to person, place, and time.    Psychiatric:         Mood and Affect: Mood normal. Behavior: Behavior normal.         Thought Content: Thought content normal.         Judgment: Judgment normal.         ASSESSMENT and PLAN  Diagnoses and all orders for this visit:    1. Purpura (HCC)  -     betamethasone dipropionate (DIPROSONE) 0.05 % topical cream; Apply  to affected area two (2) times a day. 2. HTN, goal below 140/90  -     betamethasone dipropionate (DIPROSONE) 0.05 % topical cream; Apply  to affected area two (2) times a day. 3. Advice given about COVID-19 virus infection  -     betamethasone dipropionate (DIPROSONE) 0.05 % topical cream; Apply  to affected area two (2) times a day. Concern abdout COVID-19 addressed and detailed, pt was told that the best way to prevent illness is by protection, to Wear a facemask , having social distance, and to get tested if possible, Pursuant to the emergency declaration under the [employer] and [facility], 113 waiver authority and the [employer] and [employer] Act, this Virtual Visit was conducted, with patient's consent, to reduce the patient's risk of exposure to COVID-19 and provide continuity of care for an established patient. Pt was also told if develop dyspnea needs to call 911 or go to er, call for shahnazer advise, pt agreed with todays recommendations, Services were provided through a Video synchronous discussion virtually to substitute for in-person appointment.       stopped the asa for 2 wks and start the cream bid,

## 2020-08-03 NOTE — PATIENT INSTRUCTIONS
DASH Diet: Care Instructions Your Care Instructions The DASH diet is an eating plan that can help lower your blood pressure. DASH stands for Dietary Approaches to Stop Hypertension. Hypertension is high blood pressure. The DASH diet focuses on eating foods that are high in calcium, potassium, and magnesium. These nutrients can lower blood pressure. The foods that are highest in these nutrients are fruits, vegetables, low-fat dairy products, nuts, seeds, and legumes. But taking calcium, potassium, and magnesium supplements instead of eating foods that are high in those nutrients does not have the same effect. The DASH diet also includes whole grains, fish, and poultry. The DASH diet is one of several lifestyle changes your doctor may recommend to lower your high blood pressure. Your doctor may also want you to decrease the amount of sodium in your diet. Lowering sodium while following the DASH diet can lower blood pressure even further than just the DASH diet alone. Follow-up care is a key part of your treatment and safety. Be sure to make and go to all appointments, and call your doctor if you are having problems. It's also a good idea to know your test results and keep a list of the medicines you take. How can you care for yourself at home? Following the DASH diet · Eat 4 to 5 servings of fruit each day. A serving is 1 medium-sized piece of fruit, ½ cup chopped or canned fruit, 1/4 cup dried fruit, or 4 ounces (½ cup) of fruit juice. Choose fruit more often than fruit juice. · Eat 4 to 5 servings of vegetables each day. A serving is 1 cup of lettuce or raw leafy vegetables, ½ cup of chopped or cooked vegetables, or 4 ounces (½ cup) of vegetable juice. Choose vegetables more often than vegetable juice. · Get 2 to 3 servings of low-fat and fat-free dairy each day. A serving is 8 ounces of milk, 1 cup of yogurt, or 1 ½ ounces of cheese. · Eat 6 to 8 servings of grains each day. A serving is 1 slice of bread, 1 ounce of dry cereal, or ½ cup of cooked rice, pasta, or cooked cereal. Try to choose whole-grain products as much as possible. · Limit lean meat, poultry, and fish to 2 servings each day. A serving is 3 ounces, about the size of a deck of cards. · Eat 4 to 5 servings of nuts, seeds, and legumes (cooked dried beans, lentils, and split peas) each week. A serving is 1/3 cup of nuts, 2 tablespoons of seeds, or ½ cup of cooked beans or peas. · Limit fats and oils to 2 to 3 servings each day. A serving is 1 teaspoon of vegetable oil or 2 tablespoons of salad dressing. · Limit sweets and added sugars to 5 servings or less a week. A serving is 1 tablespoon jelly or jam, ½ cup sorbet, or 1 cup of lemonade. · Eat less than 2,300 milligrams (mg) of sodium a day. If you limit your sodium to 1,500 mg a day, you can lower your blood pressure even more. Tips for success · Start small. Do not try to make dramatic changes to your diet all at once. You might feel that you are missing out on your favorite foods and then be more likely to not follow the plan. Make small changes, and stick with them. Once those changes become habit, add a few more changes. · Try some of the following: ? Make it a goal to eat a fruit or vegetable at every meal and at snacks. This will make it easy to get the recommended amount of fruits and vegetables each day. ? Try yogurt topped with fruit and nuts for a snack or healthy dessert. ? Add lettuce, tomato, cucumber, and onion to sandwiches. ? Combine a ready-made pizza crust with low-fat mozzarella cheese and lots of vegetable toppings. Try using tomatoes, squash, spinach, broccoli, carrots, cauliflower, and onions. ? Have a variety of cut-up vegetables with a low-fat dip as an appetizer instead of chips and dip. ? Sprinkle sunflower seeds or chopped almonds over salads.  Or try adding chopped walnuts or almonds to cooked vegetables. ? Try some vegetarian meals using beans and peas. Add garbanzo or kidney beans to salads. Make burritos and tacos with mashed jean-baptiste beans or black beans. Where can you learn more? Go to http://raiza-stanley.info/ Enter X808 in the search box to learn more about \"DASH Diet: Care Instructions. \" Current as of: December 16, 2019               Content Version: 12.5 © 4638-3131 [employer]. Care instructions adapted under license by [employer] (which disclaims liability or warranty for this information). If you have questions about a medical condition or this instruction, always ask your healthcare professional. [address] any warranty or liability for your use of this information.

## 2021-02-02 NOTE — PROGRESS NOTES
HISTORY OF PRESENT ILLNESS  Tere Gonzalez is a 53 y.o. male. HPI     HTN with last visit of 151/91 with current tobacco abuser 1ppd >25yrs, awaiting the ct of the chest in am,    Today pt present for Bp check and the patient stating that so far there has been a Compliancy w/ the bp meds, having had the low salt diet and try to the best of pt's knowledge to avoid smoked meats, the patient has been active life style and does do the daily walking, in addition pt states that patien doesnot  obtain the bp at home few times a week    today the pt denies Chest Pain, has no legs swelling no lightheadedness,  the pat has not been feeling anxious, and  Has not been feeling stressed out,  Hemoglobin A1c 5.2   Calcium 10.3 8.7 - 10.2  HDL Cholesterol 87   LDL, calculated 113   Back pain  The history is provided by the patient. This is a chronic problem. Episode onset: few yrs ago,  th pt is currently working, the pt is able to wash dishes and hang clothes, the pain does worsen by going up and down the steps . The problem occurs constantly. The problem has changed and worsening since onset. The pain is present in the lower back. The quality of the pain is described as dull. The pain is at a severity of 8/10. Associated symptoms include limited range of motion. Pertinent negatives include no numbness, ++ stiffness, no tingling b/l, no itching, + back pain and no neck pain. The symptoms are aggravated by movement and palpation. There has been no history of extremity trauma, the patient has no incontinence of urine nor of stool,    otherwise feeling better since the last visit       Current Outpatient Medications   Medication Sig Dispense Refill    lisinopril-hydroCHLOROthiazide (PRINZIDE, ZESTORETIC) 20-25 mg per tablet Take 1 Tab by mouth daily. 90 Tab 1    aspirin delayed-release 81 mg tablet Take 1 Tab by mouth daily. 30 Tab 11    rosuvastatin (CRESTOR) 10 mg tablet Take 1 Tab by mouth nightly.  3 Tab 2     Allergies Allergen Reactions    Bees [Hymenoptera Allergenic Extract] Hives    Codeine Hives     Past Medical History:   Diagnosis Date    Arthritis     Chronic pain     Depression     Gastrointestinal disorder     pancreatitis    Gastrointestinal disorder     mass    Hepatitis C     2018    Hypertension      Past Surgical History:   Procedure Laterality Date    HX COLONOSCOPY       Family History   Problem Relation Age of Onset    Cancer Mother     Heart Disease Mother     Arthritis-osteo Father     Heart Disease Father     Migraines Sister      Social History     Tobacco Use    Smoking status: Current Every Day Smoker     Packs/day: 1.00     Years: 30.00     Pack years: 30.00     Types: Cigarettes     Start date: 1/17/1990    Smokeless tobacco: Never Used   Substance Use Topics    Alcohol use: Yes     Alcohol/week: 10.0 standard drinks     Types: 12 Cans of beer per week     Comment: 20/week      Lab Results   Component Value Date/Time    Cholesterol, total 217 (H) 01/17/2020 10:58 AM    HDL Cholesterol 87 01/17/2020 10:58 AM    LDL, calculated 113 (H) 01/17/2020 10:58 AM    Triglyceride 83 01/17/2020 10:58 AM     Lab Results   Component Value Date/Time    ALT (SGPT) 16 01/17/2020 10:58 AM    AST (SGOT) 22 01/17/2020 10:58 AM    Alk.  phosphatase 80 01/17/2020 10:58 AM    Bilirubin, total 0.7 01/17/2020 10:58 AM    Albumin 5.4 01/17/2020 10:58 AM    Protein, total 7.9 01/17/2020 10:58 AM    INR 1.1 08/11/2011 03:41 PM    Prothrombin time 10.6 08/11/2011 03:41 PM    PLATELET 795 59/64/4636 10:58 AM     Lab Results   Component Value Date/Time    GFR est non-AA 97 01/17/2020 10:58 AM    GFR est  01/17/2020 10:58 AM    Creatinine 0.87 01/17/2020 10:58 AM    BUN 9 01/17/2020 10:58 AM    Sodium 137 01/17/2020 10:58 AM    Potassium 4.5 01/17/2020 10:58 AM    Chloride 96 01/17/2020 10:58 AM    CO2 24 01/17/2020 10:58 AM    Magnesium 1.9 09/05/2019 01:47 AM        Review of Systems   Constitutional: Negative for chills and fever. HENT: Negative for nosebleeds. Eyes: Negative for pain. Respiratory: Negative for cough and wheezing. Cardiovascular: Negative for chest pain and leg swelling. Gastrointestinal: Negative for constipation, diarrhea and nausea. Genitourinary: Negative for frequency. Musculoskeletal: Negative for joint pain and myalgias. Skin: Negative for rash. Neurological: Negative for loss of consciousness. Endo/Heme/Allergies: Does not bruise/bleed easily. Psychiatric/Behavioral: Negative for depression. The patient is not nervous/anxious and does not have insomnia. All other systems reviewed and are negative. Physical Exam  Vitals signs and nursing note reviewed. Constitutional:       Appearance: He is well-developed. HENT:      Head: Normocephalic and atraumatic. Mouth/Throat:      Pharynx: No oropharyngeal exudate. Eyes:      Conjunctiva/sclera: Conjunctivae normal.      Pupils: Pupils are equal, round, and reactive to light. Neck:      Musculoskeletal: Normal range of motion and neck supple. Thyroid: No thyromegaly. Vascular: No JVD. Cardiovascular:      Rate and Rhythm: Normal rate and regular rhythm. Heart sounds: Normal heart sounds. No murmur. No friction rub. Pulmonary:      Effort: Pulmonary effort is normal. No respiratory distress. Breath sounds: Normal breath sounds. No wheezing or rales. Abdominal:      General: Bowel sounds are normal. There is no distension. Palpations: Abdomen is soft. Tenderness: There is no abdominal tenderness. Musculoskeletal:         General: No tenderness. Lymphadenopathy:      Cervical: No cervical adenopathy. Skin:     General: Skin is warm. Findings: No erythema or rash. Neurological:      Mental Status: He is alert and oriented to person, place, and time. Deep Tendon Reflexes: Reflexes are normal and symmetric.    Psychiatric:         Behavior: Behavior normal. ASSESSMENT and PLAN    Diagnoses and all orders for this visit:    1. Need for hepatitis C screening test  -     HEPATITIS C AB    2. Mixed hyperlipidemia  -     rosuvastatin (CRESTOR) 10 mg tablet; Take 1 Tab by mouth nightly. 3. HTN, goal below 140/90  -     rosuvastatin (CRESTOR) 10 mg tablet; Take 1 Tab by mouth nightly.  -     REFERRAL TO CARDIOLOGY    4. Encounter for immunization  -     varicella-zoster recombinant, PF, (SHINGRIX) 50 mcg/0.5 mL susr injection; 0.5 mL by IntraMUSCular route once for 1 dose. 5. Tobacco abuse disorder  -     REFERRAL TO CARDIOLOGY    6. Numbness and tingling of left arm and leg  -     REFERRAL TO CARDIOLOGY    7. Vitamin D deficiency  -     ergocalciferol (ERGOCALCIFEROL) 1,250 mcg (50,000 unit) capsule; Take 1 Cap by mouth every seven (7) days. 8. Chronic bilateral low back pain without sciatica  -     REFERRAL TO ORTHOPEDICS    9. Erectile dysfunction due to diseases classified elsewhere  -     sildenafil citrate (VIAGRA) 100 mg tablet; Take 1 Tab by mouth as needed for Erectile Dysfunction. Indications: the inability to have an erection      Tylenol for pain, take meds w/ food and water, if develop abdominal upsets, such as heart burn and sour stomach. Also do the exercise therapy: Ice therapy 2-30min tid daily, daily stretching x2 daily for 5-10 min, rom strengthening with resistance banding 3-4 times per week  Most of the how to do informations printed and handed out to the patient,   meds side effects and compliancy advised,  Call or rtc if worsens,   Pt agreed with today's recommendations. Rhomboid Transposition Flap Text: The defect edges were debeveled with a #15 scalpel blade.  Given the location of the defect and the proximity to free margins a rhomboid transposition flap was deemed most appropriate.  Using a sterile surgical marker, an appropriate rhomboid flap was drawn incorporating the defect.    The area thus outlined was incised deep to adipose tissue with a #15 scalpel blade.  The skin margins were undermined to an appropriate distance in all directions utilizing iris scissors.

## 2021-02-04 DIAGNOSIS — E78.2 MIXED HYPERLIPIDEMIA: ICD-10-CM

## 2021-02-04 DIAGNOSIS — I10 HTN, GOAL BELOW 140/90: ICD-10-CM

## 2021-02-05 RX ORDER — LISINOPRIL AND HYDROCHLOROTHIAZIDE 20; 25 MG/1; MG/1
TABLET ORAL
Qty: 30 TAB | Refills: 5 | Status: SHIPPED | OUTPATIENT
Start: 2021-02-05 | End: 2021-03-11 | Stop reason: SDUPTHER

## 2021-02-05 RX ORDER — LISINOPRIL AND HYDROCHLOROTHIAZIDE 20; 25 MG/1; MG/1
1 TABLET ORAL DAILY
Qty: 90 TAB | Refills: 1 | Status: SHIPPED | OUTPATIENT
Start: 2021-02-05 | End: 2021-03-11 | Stop reason: SDUPTHER

## 2021-03-11 ENCOUNTER — OFFICE VISIT (OUTPATIENT)
Dept: FAMILY MEDICINE CLINIC | Age: 56
End: 2021-03-11
Payer: COMMERCIAL

## 2021-03-11 VITALS
HEART RATE: 82 BPM | HEIGHT: 68 IN | DIASTOLIC BLOOD PRESSURE: 84 MMHG | BODY MASS INDEX: 23.67 KG/M2 | WEIGHT: 156.2 LBS | TEMPERATURE: 98.4 F | SYSTOLIC BLOOD PRESSURE: 129 MMHG | RESPIRATION RATE: 16 BRPM | OXYGEN SATURATION: 99 %

## 2021-03-11 DIAGNOSIS — Z00.00 LABORATORY EXAM ORDERED AS PART OF ROUTINE GENERAL MEDICAL EXAMINATION: ICD-10-CM

## 2021-03-11 DIAGNOSIS — E55.9 VITAMIN D DEFICIENCY: ICD-10-CM

## 2021-03-11 DIAGNOSIS — I10 HTN, GOAL BELOW 140/90: ICD-10-CM

## 2021-03-11 DIAGNOSIS — Z87.891 PERSONAL HISTORY OF TOBACCO USE, PRESENTING HAZARDS TO HEALTH: ICD-10-CM

## 2021-03-11 DIAGNOSIS — Z12.5 SPECIAL SCREENING FOR MALIGNANT NEOPLASM OF PROSTATE: ICD-10-CM

## 2021-03-11 DIAGNOSIS — F17.210 CIGARETTE NICOTINE DEPENDENCE WITHOUT COMPLICATION: ICD-10-CM

## 2021-03-11 DIAGNOSIS — Z13.31 SCREENING FOR DEPRESSION: ICD-10-CM

## 2021-03-11 DIAGNOSIS — Z00.00 MEDICARE ANNUAL WELLNESS VISIT, SUBSEQUENT: Primary | ICD-10-CM

## 2021-03-11 DIAGNOSIS — E78.2 MIXED HYPERLIPIDEMIA: ICD-10-CM

## 2021-03-11 PROCEDURE — 99406 BEHAV CHNG SMOKING 3-10 MIN: CPT | Performed by: FAMILY MEDICINE

## 2021-03-11 PROCEDURE — G0439 PPPS, SUBSEQ VISIT: HCPCS | Performed by: FAMILY MEDICINE

## 2021-03-11 RX ORDER — LISINOPRIL AND HYDROCHLOROTHIAZIDE 20; 25 MG/1; MG/1
TABLET ORAL
Qty: 90 TAB | Refills: 2 | Status: SHIPPED | OUTPATIENT
Start: 2021-03-11 | End: 2022-04-25

## 2021-03-11 RX ORDER — ERGOCALCIFEROL 1.25 MG/1
50000 CAPSULE ORAL
Qty: 4 CAP | Refills: 11 | Status: SHIPPED | OUTPATIENT
Start: 2021-03-11 | End: 2022-03-24

## 2021-03-11 RX ORDER — DICLOFENAC SODIUM 10 MG/G
4 GEL TOPICAL EVERY 6 HOURS
Qty: 150 G | Refills: 4 | Status: SHIPPED | OUTPATIENT
Start: 2021-03-11

## 2021-03-11 RX ORDER — ROSUVASTATIN CALCIUM 10 MG/1
10 TABLET, COATED ORAL
Qty: 90 TAB | Refills: 5 | Status: SHIPPED | OUTPATIENT
Start: 2021-03-11 | End: 2022-04-25

## 2021-03-11 RX ORDER — BUPROPION HYDROCHLORIDE 150 MG/1
150 TABLET ORAL
Qty: 30 TAB | Refills: 3 | Status: SHIPPED | OUTPATIENT
Start: 2021-03-11

## 2021-03-11 NOTE — PROGRESS NOTES
This is the Subsequent Medicare Annual Wellness Exam, performed 12 months or more after the Initial AWV or the last Subsequent AWV    I have reviewed the patient's medical history in detail and updated the computerized patient record. Depression Risk Factor Screening:     3 most recent PHQ Screens 3/11/2021   Little interest or pleasure in doing things Not at all   Feeling down, depressed, irritable, or hopeless Not at all   Total Score PHQ 2 0       Alcohol Risk Screen    Do you average more than 2 drinks per night or 14 drinks a week: No    On any one occasion in the past three months have you have had more than 4 drinks containing alcohol:  No        Functional Ability and Level of Safety:    Hearing: Hearing is good. Activities of Daily Living: The home contains: handrails, grab bars and rugs  Patient does total self care      Ambulation: with no difficulty     Fall Risk:  Fall Risk Assessment, last 12 mths 3/11/2021   Able to walk? -   Fall in past 12 months? -   Do you feel unsteady? -   Are you worried about falling -   Is TUG test greater than 12 seconds? -   Is the gait abnormal? -   Number of falls in past 12 months -   Fall with injury? 1      Abuse Screen:  Patient is not abused       Cognitive Screening    Has your family/caregiver stated any concerns about your memory: no     Cognitive Screening: Normal - MMSE (Mini Mental Status Exam)    Assessment/Plan   Education and counseling provided:  Are appropriate based on today's review and evaluation  End-of-Life planning (with patient's consent)  Pneumococcal Vaccine  Influenza Vaccine  Prostate cancer screening tests (PSA, covered annually)  Colorectal cancer screening tests    Diagnoses and all orders for this visit:    1. Mixed hyperlipidemia  -     lisinopril-hydroCHLOROthiazide (PRINZIDE, ZESTORETIC) 20-25 mg per tablet; TAKE 1 TABLET BY MOUTH EVERY DAY  -     rosuvastatin (CRESTOR) 10 mg tablet; Take 1 Tab by mouth nightly.     2. HTN, goal below 140/90  -     lisinopril-hydroCHLOROthiazide (PRINZIDE, ZESTORETIC) 20-25 mg per tablet; TAKE 1 TABLET BY MOUTH EVERY DAY  -     rosuvastatin (CRESTOR) 10 mg tablet; Take 1 Tab by mouth nightly. 3. Vitamin D deficiency  -     ergocalciferol (ERGOCALCIFEROL) 1,250 mcg (50,000 unit) capsule; Take 1 Cap by mouth every seven (7) days. 4. Medicare annual wellness visit, subsequent    5. Screening for depression  -     DEPRESSION SCREEN ANNUAL    6. Personal history of tobacco use, presenting hazards to health  -     CT LOW DOSE LUNG CANCER SCREENING; Future    7. Special screening for malignant neoplasm of prostate  -     PSA SCREENING (SCREENING); Future    8.  Cigarette nicotine dependence without complication  -     RI SMOKING AND TOBACCO USE CESSATION 3 - 10 MINUTES    Other orders  -     REFERRAL TO NEUROLOGY  -     REFERRAL TO ORTHOPEDICS  -     REFERRAL TO CARDIOLOGY        Health Maintenance Due     Health Maintenance Due   Topic Date Due    Hepatitis C Screening  Never done    COVID-19 Vaccine (1 of 2) Never done    Shingrix Vaccine Age 50> (1 of 2) Never done    Colorectal Cancer Screening Combo  Never done    Lipid Screen  01/17/2021       Patient Care Team   Patient Care Team:  Mini Cárdenas MD as PCP - General (Family Medicine)  Mini Cárdenas MD as PCP - Thanh Woodard Provider    History     Patient Active Problem List   Diagnosis Code    Paronychia [id number]    Vomiting R11.10    Abdominal pain, acute, epigastric R10.13    Essential hypertension I10    High cholesterol E78.00     Past Medical History:   Diagnosis Date    Arthritis     Chronic pain     Depression     Gastrointestinal disorder     pancreatitis    Gastrointestinal disorder     mass    Hepatitis C     2018    Hypertension       Past Surgical History:   Procedure Laterality Date    HX COLONOSCOPY       Current Outpatient Medications   Medication Sig Dispense Refill    lisinopril-hydroCHLOROthiazide (PRINZIDE, ZESTORETIC) 20-25 mg per tablet TAKE 1 TABLET BY MOUTH EVERY DAY 30 Tab 5    ergocalciferol (ERGOCALCIFEROL) 1,250 mcg (50,000 unit) capsule Take 1 Cap by mouth every seven (7) days. 4 Cap 11    betamethasone dipropionate (DIPROSONE) 0.05 % topical cream Apply  to affected area two (2) times a day. (Patient not taking: Reported on 3/11/2021) 45 g 1    rosuvastatin (CRESTOR) 10 mg tablet Take 1 Tab by mouth nightly. (Patient not taking: Reported on 3/11/2021) 90 Tab 5    sildenafil citrate (VIAGRA) 100 mg tablet Take 1 Tab by mouth as needed for Erectile Dysfunction. Indications: the inability to have an erection (Patient not taking: Reported on 8/3/2020) 12 Tab 2    aspirin delayed-release 81 mg tablet Take 1 Tab by mouth daily. (Patient not taking: Reported on 3/11/2021) 30 Tab 11     Allergies   Allergen Reactions    Bees [Hymenoptera Allergenic Extract] Hives    Codeine Hives       Family History   Problem Relation Age of Onset    Cancer Mother     Heart Disease Mother     Arthritis-osteo Father     Heart Disease Father     Migraines Sister      Social History     Tobacco Use    Smoking status: Current Every Day Smoker     Packs/day: 1.00     Years: 30.00     Pack years: 30.00     Types: Cigarettes     Start date: 1/17/1990    Smokeless tobacco: Never Used   Substance Use Topics    Alcohol use: Yes     Alcohol/week: 10.0 standard drinks     Types: 12 Cans of beer per week     Comment: 20/week     Discussed with patient current guidelines for screening for lung cancer. Current recommendations are to obtain yearly screening LDCT yearly for age 46-80, or until smoke free for 15 years. In addition, patient was counseled regarding (remaining smoke free/total smoking cessation).

## 2021-03-11 NOTE — PROGRESS NOTES
Chief Complaint   Patient presents with    Complete Physical     1. Have you been to the ER, urgent care clinic since your last visit? Hospitalized since your last visit? No    2. Have you seen or consulted any other health care providers outside of the [address] [place] [address] since your last visit? Include any pap smears or colon screening. No    Verified patient with two type of identifiers.   requesting referrals    Declines flu vaccine

## 2021-03-11 NOTE — PATIENT INSTRUCTIONS
Medicare Wellness Visit, Male    The best way to live healthy is to have a lifestyle where you eat a well-balanced diet, exercise regularly, limit alcohol use, and quit all forms of tobacco/nicotine, if applicable. Regular preventive services are another way to keep healthy. Preventive services (vaccines, screening tests, monitoring & exams) can help personalize your care plan, which helps you manage your own care. Screening tests can find health problems at the earliest stages, when they are easiest to treat. [facility] follows the current, evidence-based guidelines published by the [facility] Daniel ([facility]) when recommending preventive services for our patients. Because we follow these guidelines, sometimes recommendations change over time as research supports it. (For example, a prostate screening blood test is no longer routinely recommended for men with no symptoms). Of course, you and your doctor may decide to screen more often for some diseases, based on your risk and co-morbidities (chronic disease you are already diagnosed with). Preventive services for you include:  - Medicare offers their members a free annual wellness visit, which is time for you and your primary care provider to discuss and plan for your preventive service needs. Take advantage of this benefit every year!  -All adults over age 72 should receive the recommended pneumonia vaccines. Current USPSTF guidelines recommend a series of two vaccines for the best pneumonia protection.   -All adults should have a flu vaccine yearly and tetanus vaccine every 10 years.  -All adults age 48 and older should receive the shingles vaccines (series of two vaccines).        -All adults age 38-68 who are overweight should have a diabetes screening test once every three years.   -Other screening tests & preventive services for persons with diabetes include: an eye exam to screen for diabetic retinopathy, a kidney function test, a foot exam, and stricter control over your cholesterol.   -Cardiovascular screening for adults with routine risk involves an electrocardiogram (ECG) at intervals determined by the provider.   -Colorectal cancer screening should be done for adults age 54-65 with no increased risk factors for colorectal cancer. There are a number of acceptable methods of screening for this type of cancer. Each test has its own benefits and drawbacks. Discuss with your provider what is most appropriate for you during your annual wellness visit. The different tests include: colonoscopy (considered the best screening method), a fecal occult blood test, a fecal DNA test, and sigmoidoscopy.  -All adults born between [facility] should be screened once for Hepatitis C.  -An Abdominal Aortic Aneurysm (AAA) Screening is recommended for men age 73-68 who has ever smoked in their lifetime. Here is a list of your current Health Maintenance items (your personalized list of preventive services) with a due date:  Health Maintenance Due   Topic Date Due    Hepatitis C Test  Never done    COVID-19 Vaccine (1 of 2) Never done    Shingles Vaccine (1 of 2) Never done    Colorectal Screening  Never done    Cholesterol Test   01/17/2021          Prostate Cancer Screening: Care Instructions  Your Care Instructions     Prostate cancer is the abnormal growth of cells in the prostate gland. This is an organ found just below a man's bladder. Screening can help find prostate cancer early. When it is found and treated early, the cancer may be cured. But it's not always treated. That's because the treatments can cause serious side effects. For most men, prostate cancer won't shorten their lives, especially if they are older and the cancer is growing slowly. Prostate cancer is the second most common type of cancer in men. Most cases occur in men older than 72. The disease runs in families.  And it's more common in -American men. Follow-up care is a key part of your treatment and safety. Be sure to make and go to all appointments, and call your doctor if you are having problems. It's also a good idea to know your test results and keep a list of the medicines you take. What is the screening test for prostate cancer? The main screening test for prostate cancer is the prostate-specific antigen (PSA) test. This is a blood test that measures how much PSA is in your blood. A high level may mean that you have an enlarged prostate, an infection, or cancer. Along with the PSA test, you may have a digital (finger) rectal exam. This exam checks for anything abnormal in your prostate. To do the exam, the doctor puts a lubricated, gloved finger into your rectum. If these tests suggest cancer, you may need a prostate biopsy. How is prostate cancer diagnosed? In a biopsy, the doctor takes small tissue samples from your prostate gland. Another doctor then looks at the tissue under a microscope to see if there are cancer cells, signs of infection, or other problems. The results help diagnose prostate cancer. What are the pros and cons of screening? Neither a PSA test nor a digital rectal exam can tell you for sure that you do or do not have cancer. But they can help you decide if you need more tests, such as a prostate biopsy. Screening tests may be useful because most men with prostate cancer don't have symptoms. It can be hard to know if you have cancer until it is more advanced. And then it's harder to treat. But having a PSA test can also cause harm. The test may show high levels of PSA that aren't caused by cancer. So you could have a prostate biopsy you didn't need. Or the PSA test might be normal when there is cancer, so a cancer might not be found early. The test can also find cancers that would never have caused a problem during your lifetime. So you might have treatment that was not needed.   Prostate cancer usually develops late in life and grows slowly. For many men, it does not shorten their lives. Some experts advise screening only for men who are at high risk. Talk with your doctor to see if screening is right for you. Where can you learn more? Go to http://www.gray.com/  Enter R550 in the search box to learn more about \"Prostate Cancer Screening: Care Instructions. \"  Current as of: April 29, 2020               Content Version: 12.6  © 2006-2020 [employer]. Care instructions adapted under license by [employer] (which disclaims liability or warranty for this information). If you have questions about a medical condition or this instruction, always ask your healthcare professional. [place] [address] any warranty or liability for your use of this information. Colon Cancer Screening: Care Instructions  Your Care Instructions    Colorectal cancer occurs in the colon or rectum. That's the lower part of your digestive system. It is the second-leading cause of cancer deaths in the [place]. It often starts with small growths called polyps in the colon or rectum. Polyps are usually found with screening tests. Depending on the type of test, any polyps found may be removed during the tests. Colorectal cancer usually does not cause symptoms at first. But regular tests can help find it early, before it spreads and becomes harder to treat. Experts advise routine tests for colon cancer for people starting at age 48. And they advise people with a higher risk of colon cancer to get tested sooner. Talk with your doctor about when you should start testing. Discuss which tests you need. Follow-up care is a key part of your treatment and safety. Be sure to make and go to all appointments, and call your doctor if you are having problems. It's also a good idea to know your test results and keep a list of the medicines you take.   What are the main screening tests for colon cancer? · Stool tests. These include the fecal immunochemical test (FIT) and the fecal occult blood test (FOBT). These tests check stool samples for signs of cancer. If your test is positive, you will need to have a colonoscopy. · Sigmoidoscopy. This test lets your doctor look at the lining of your rectum and the lowest part of your colon. Your doctor uses a lighted tube called a sigmoidoscope. This test can't find cancers or polyps in the upper part of your colon. In some cases, polyps that are found can be removed. But if your doctor finds polyps, you will need to have a colonoscopy to check the upper part of your colon. · Colonoscopy. This test lets your doctor look at the lining of your rectum and your entire colon. The doctor uses a thin, flexible tool called a colonoscope. It can also be used to remove polyps or get a tissue sample (biopsy). What tests do you need? The following guidelines are for people age 48 and over who are not at high risk for colorectal cancer. You may have at least one of these tests as directed by your doctor. · Fecal immunochemical test (FIT) or fecal occult blood test (FOBT) every year  · Sigmoidoscopy every 5 years  · Colonoscopy every 10 years  If you are age 68 to 80, you can work with your doctor to decide if screening is a good option. If you are age 80 or older, your doctor will likely advise that screening is not helpful. Talk with your doctor about when you need to be tested. And discuss which tests are right for you. Your doctor may recommend earlier or more frequent testing if you:  · Have had colorectal cancer before. · Have had colon polyps. · Have symptoms of colorectal cancer. These include blood in your stool and changes in your bowel habits. · Have a parent, brother or sister, or child with colon polyps or colorectal cancer. · Have a bowel disease. This includes ulcerative colitis and Crohn's disease.   · Have a rare polyp syndrome that runs in families, such as familial adenomatous polyposis (FAP). · Have had radiation treatments to the belly or pelvis. When should you call for help? Watch closely for changes in your health, and be sure to contact your doctor if:    · You have any changes in your bowel habits.     · You have any problems. Where can you learn more? Go to http://www.gray.com/. Enter M541 in the search box to learn more about \"Colon Cancer Screening: Care Instructions. \"  Current as of: March 28, 2018  Content Version: 11.8  © 5290-6942 [employer]. Care instructions adapted under license by [employer] (which disclaims liability or warranty for this information). If you have questions about a medical condition or this instruction, always ask your healthcare professional. [address] any warranty or liability for your use of this information. Preventing Falls: Care Instructions  Your Care Instructions    Getting around your home safely can be a challenge if you have injuries or health problems that make it easy for you to fall. Loose rugs and furniture in walkways are among the dangers for many older people who have problems walking or who have poor eyesight. People who have conditions such as arthritis, osteoporosis, or dementia also have to be careful not to fall. You can make your home safer with a few simple measures. Follow-up care is a key part of your treatment and safety. Be sure to make and go to all appointments, and call your doctor if you are having problems. It's also a good idea to know your test results and keep a list of the medicines you take. How can you care for yourself at home? Taking care of yourself  · You may get dizzy if you do not drink enough water. To prevent dehydration, drink plenty of fluids, enough so that your urine is light yellow or clear like water. Choose water and other caffeine-free clear liquids.  If you have kidney, heart, or liver disease and have to limit fluids, talk with your doctor before you increase the amount of fluids you drink. · Exercise regularly to improve your strength, muscle tone, and balance. Walk if you can. Swimming may be a good choice if you cannot walk easily. · Have your vision and hearing checked each year or any time you notice a change. If you have trouble seeing and hearing, you might not be able to avoid objects and could lose your balance. · Know the side effects of the medicines you take. Ask your doctor or pharmacist whether the medicines you take can affect your balance. Sleeping pills or sedatives can affect your balance. · Limit the amount of alcohol you drink. Alcohol can impair your balance and other senses. · Ask your doctor whether calluses or corns on your feet need to be removed. If you wear loose-fitting shoes because of calluses or corns, you can lose your balance and fall. · Talk to your doctor if you have numbness in your feet. Preventing falls at home  · Remove raised doorway thresholds, throw rugs, and clutter. Repair loose carpet or raised areas in the floor. · Move furniture and electrical cords to keep them out of walking paths. · Use nonskid floor wax, and wipe up spills right away, especially on ceramic tile floors. · If you use a walker or cane, put rubber tips on it. If you use crutches, clean the bottoms of them regularly with an abrasive pad, such as steel wool. · Keep your house well lit, especially [facility], and outside walkways. Use night-lights in areas such as hallways and bathrooms. Add extra light switches or use remote switches (such as switches that go on or off when you clap your hands) to make it easier to turn lights on if you have to get up during the night. · Install sturdy handrails on stairways. · Move items in your cabinets so that the things you use a lot are on the lower shelves (about waist level).   · Keep a cordless phone and a flashlight with new batteries by your bed. If possible, put a phone in each of the main rooms of your house, or carry a cell phone in case you fall and cannot reach a phone. Or, you can wear a device around your neck or wrist. You push a button that sends a signal for help. · Wear low-heeled shoes that fit well and give your feet good support. Use footwear with nonskid soles. Check the heels and soles of your shoes for wear. Repair or replace worn heels or soles. · Do not wear socks without shoes on wood floors. · Walk on the grass when the sidewalks are slippery. If you live in an area that gets snow and ice in the winter, sprinkle salt on slippery steps and sidewalks. Preventing falls in the bath  · Install grab bars and nonskid mats inside and outside your shower or tub and near the toilet and sinks. · Use shower chairs and bath benches. · Use a hand-held shower head that will allow you to sit while showering. · Get into a tub or shower by putting the weaker leg in first. Get out of a tub or shower with your strong side first.  · Repair loose toilet seats and consider installing a raised toilet seat to make getting on and off the toilet easier. · Keep your bathroom door unlocked while you are in the shower. Where can you learn more? Go to http://www.gay.com/. Enter [id number] [id number] in the search box to learn more about \"Preventing Falls: Care Instructions. \"  Current as of: March 16, 2018  Content Version: 11.8  © 4118-3289 [employer]. Care instructions adapted under license by [employer] (which disclaims liability or warranty for this information). If you have questions about a medical condition or this instruction, always ask your healthcare professional. [place] [address] any warranty or liability for your use of this information.

## 2021-04-20 ENCOUNTER — HOSPITAL ENCOUNTER (OUTPATIENT)
Dept: CT IMAGING | Age: 56
Discharge: HOME | End: 2021-04-20
Attending: FAMILY MEDICINE
Payer: COMMERCIAL

## 2021-04-20 DIAGNOSIS — Z87.891 PERSONAL HISTORY OF TOBACCO USE, PRESENTING HAZARDS TO HEALTH: ICD-10-CM

## 2021-04-20 PROCEDURE — 71271 CT THORAX LUNG CANCER SCR C-: CPT

## 2021-04-29 ENCOUNTER — OFFICE VISIT (OUTPATIENT)
Dept: NEUROLOGY | Age: 56
End: 2021-04-29
Payer: COMMERCIAL

## 2021-04-29 VITALS
RESPIRATION RATE: 16 BRPM | DIASTOLIC BLOOD PRESSURE: 98 MMHG | HEART RATE: 88 BPM | HEIGHT: 70 IN | SYSTOLIC BLOOD PRESSURE: 138 MMHG | OXYGEN SATURATION: 98 % | WEIGHT: 160 LBS | BODY MASS INDEX: 22.9 KG/M2

## 2021-04-29 DIAGNOSIS — E53.8 B12 DEFICIENCY: ICD-10-CM

## 2021-04-29 DIAGNOSIS — R56.9 SEIZURE (HCC): Primary | ICD-10-CM

## 2021-04-29 DIAGNOSIS — R20.0 SENSORY LOSS: ICD-10-CM

## 2021-04-29 DIAGNOSIS — E55.9 VITAMIN D DEFICIENCY: ICD-10-CM

## 2021-04-29 DIAGNOSIS — G45.9 TRANSIENT ISCHEMIC ATTACK (TIA): ICD-10-CM

## 2021-04-29 PROCEDURE — 99214 OFFICE O/P EST MOD 30 MIN: CPT | Performed by: PSYCHIATRY & NEUROLOGY

## 2021-04-29 NOTE — PROGRESS NOTES
NEUROLOGY HISTORY AND PHYSICAL    Name Janina Bee Age 63 y.o. MRN [id number]  1965     Referring Physician: Winsome Alex MD      Chief Complaint:  Back pain     This is a 63y.o. year old right handed male with degenerative disc disease. He is here for evaluation of spells of tingling affecting the right half of his body. These would last about ten minutes. He would lose control of arm and the leg. It would resolve gradually. He was gettng once or twice a week or five days a week. These episodes started 8 months ago. Assessment and Plan  1. Seizure ([address]  - MRI BRAIN WO CONT; Future    2. Transient ischemic attack (TIA)  Concerning he is having crescendo like symptoms  continue aspirin  - MRI BRAIN WO CONT; Future  - MRA BRAIN WO CONT; Future  - MRA NECK WO CONT; Future    3. Sensory loss  - EMG NCV MOTOR WITH F/WAVE PER NERVE; Future    4. Vitamin D deficiency  continue vitamin D    5. B12 deficiency  - VITAMIN B12; Future       Allergies   Allergen Reactions    Bees [Hymenoptera Allergenic Extract] Hives    Codeine Hives           Current Outpatient Medications   Medication Sig    lisinopril-hydroCHLOROthiazide (PRINZIDE, ZESTORETIC) 20-25 mg per tablet TAKE 1 TABLET BY MOUTH EVERY DAY    ergocalciferol (ERGOCALCIFEROL) 1,250 mcg (50,000 unit) capsule Take 1 Cap by mouth every seven (7) days.  diclofenac (VOLTAREN) 1 % gel Apply 4 g to affected area every six (6) hours.  rosuvastatin (CRESTOR) 10 mg tablet Take 1 Tab by mouth nightly.  buPROPion XL (WELLBUTRIN XL) 150 mg tablet Take 1 Tab by mouth every morning. No current facility-administered medications for this visit.         Past Medical History:   Diagnosis Date    Arthritis     Chronic pain     Degenerative disc disease at L5-S1 level     Depression     Gastrointestinal disorder     pancreatitis    Gastrointestinal disorder     mass    Hepatitis C     2018    Hypertension     Sciatica     Scoliosis Social History     Tobacco Use    Smoking status: Current Every Day Smoker     Packs/day: 1.00     Years: 30.00     Pack years: 30.00     Types: Cigarettes     Start date: 1/17/1990    Smokeless tobacco: Never Used   Substance Use Topics    Alcohol use: Yes     Alcohol/week: 10.0 standard drinks     Types: 12 Cans of beer per week     Comment: 20/week    Drug use: Yes     Types: Marijuana     Comment: occasionally        Review of Systems   Constitutional: Negative for chills and fever. HENT: Negative for ear pain. Eyes: Negative for pain and discharge. Respiratory: Negative for cough and hemoptysis. Cardiovascular: Negative for chest pain and claudication. Gastrointestinal: Negative for constipation and diarrhea. Genitourinary: Negative for flank pain and hematuria. Musculoskeletal: Positive for back pain, joint pain and myalgias. Skin: Negative for itching and rash. Neurological: Positive for sensory change. Negative for headaches. Endo/Heme/Allergies: Negative for environmental allergies. Does not bruise/bleed easily. Psychiatric/Behavioral: Negative for depression and hallucinations. Exam  Visit Vitals  BP (!) 138/98 (BP 1 Location: Left arm, BP Patient Position: Sitting, BP Cuff Size: Adult)   Pulse 88   Resp 16   Ht 5' 10\" (1.778 m)   Wt 160 lb (72.6 kg)   SpO2 98%   BMI 22.96 kg/m²         General: Well developed, well nourished. Patient in no distress   Head: Normocephalic, atraumatic, anicteric sclera   Neck Normal ROM, No thyromegally   Lungs:  Clear to auscultation    Cardiac: Regular rate and rhythm with no murmurs. Abd: Bowel sounds were audible   Ext: No pedal edema   Skin: Supple no rash     NeurologicExam:  Mental Status: Alert and oriented to person place and time   Speech: Fluent no aphasia or dysarthria. Cranial Nerves:   II-XII Intact    Motor:  Full and symmetric strength of upper and lower ext . Normal bulk and tone.     Reflexes:   Deep tendon reflexes 2+/4 and symmetric. Sensory:   Sensory loss on the right. Gait:  Gait is antalgic    Tremor:   No tremor noted. Cerebellar:  Coordination intact. Neurovascular: No carotid bruits.  No JVD      Lab Review  Lab Results   Component Value Date/Time    WBC 8.2 01/17/2020 10:58 AM    HCT 45.3 01/17/2020 10:58 AM    HGB 16.3 01/17/2020 10:58 AM    PLATELET 681 92/63/0206 10:58 AM     Lab Results   Component Value Date/Time    Sodium 137 01/17/2020 10:58 AM    Potassium 4.5 01/17/2020 10:58 AM    Chloride 96 01/17/2020 10:58 AM    CO2 24 01/17/2020 10:58 AM    Glucose 91 01/17/2020 10:58 AM    BUN 9 01/17/2020 10:58 AM    Creatinine 0.87 01/17/2020 10:58 AM    Calcium 10.3 (H) 01/17/2020 10:58 AM     Lab Results   Component Value Date/Time    Vitamin B12 521 01/17/2020 10:58 AM    Folate 9.3 01/17/2020 10:58 AM     Lab Results   Component Value Date/Time    LDL, calculated 113 (H) 01/17/2020 10:58 AM     Lab Results   Component Value Date/Time    Hemoglobin A1c 5.2 01/17/2020 10:58 AM

## 2021-04-29 NOTE — PROGRESS NOTES
Chief Complaint   Patient presents with   [place] New Patient     been having spells starting at the top of his head and comes down the right side of his head, neck, and the right side, he will lose mobility of his arm       Sometimes gets a painful tingle in the arm/leg     Patient has never went to the ER to get evaluated for this     Seeing heart specialist May 4th    Patient stated he has 5 different things wrong with his back the [facility] told him    Seeing an orthopedist May 6th     Patient also has bone spurs of the vertebrae

## 2021-05-04 ENCOUNTER — OFFICE VISIT (OUTPATIENT)
Dept: CARDIOLOGY CLINIC | Age: 56
End: 2021-05-04
Payer: COMMERCIAL

## 2021-05-04 VITALS
OXYGEN SATURATION: 99 % | HEIGHT: 68 IN | RESPIRATION RATE: 18 BRPM | BODY MASS INDEX: 23.07 KG/M2 | DIASTOLIC BLOOD PRESSURE: 90 MMHG | WEIGHT: 152.2 LBS | HEART RATE: 86 BPM | SYSTOLIC BLOOD PRESSURE: 126 MMHG

## 2021-05-04 DIAGNOSIS — I25.10 CORONARY ARTERY DISEASE INVOLVING NATIVE CORONARY ARTERY OF NATIVE HEART WITHOUT ANGINA PECTORIS: Primary | ICD-10-CM

## 2021-05-04 DIAGNOSIS — Z72.0 TOBACCO USE: ICD-10-CM

## 2021-05-04 DIAGNOSIS — E78.01 FAMILIAL HYPERCHOLESTEROLEMIA: ICD-10-CM

## 2021-05-04 DIAGNOSIS — I10 ESSENTIAL HYPERTENSION: ICD-10-CM

## 2021-05-04 DIAGNOSIS — M62.81 RIGHT-SIDED MUSCLE WEAKNESS: ICD-10-CM

## 2021-05-04 DIAGNOSIS — R00.2 PALPITATIONS: ICD-10-CM

## 2021-05-04 PROCEDURE — 93000 ELECTROCARDIOGRAM COMPLETE: CPT | Performed by: INTERNAL MEDICINE

## 2021-05-04 PROCEDURE — 99204 OFFICE O/P NEW MOD 45 MIN: CPT | Performed by: INTERNAL MEDICINE

## 2021-05-04 RX ORDER — ASPIRIN 81 MG/1
81 TABLET ORAL DAILY
COMMUNITY

## 2021-05-04 NOTE — PROGRESS NOTES
Gui Cons, NP         Subjective/HPI:     Cem Pascual is a 63 y.o. male is here for new patient consultation. The patient has medical hx significant for HTN, Familial Hyperlipidemia, and tobacco use. The pt presents with c/o episodes that start with tingling in the top right of his head, travels down the right side of his body and then has onset of right arm/leg weakness. He has been seen by neurology who is concerned he is having TIA events. He is to have an MRI/MRA and EMG for evaluation. He reports he has heart fluttering beats, feels like it skips a beat. He has had this for some time. Occurring more frequently. He has some MOSELEY, but this he has blamed on smoking. He had a CT scan completed showing mod CAD 4/20/21. He denies CP, orthopnea, PND, or edema. Previous cardiac evaluation includes stress test over 10 years ago neg. Family med hx significant for mother with MI/CABG, father with CAD/MI        Current Outpatient Medications   Medication Sig    aspirin delayed-release 81 mg tablet Take 81 mg by mouth daily.  lisinopril-hydroCHLOROthiazide (PRINZIDE, ZESTORETIC) 20-25 mg per tablet TAKE 1 TABLET BY MOUTH EVERY DAY    ergocalciferol (ERGOCALCIFEROL) 1,250 mcg (50,000 unit) capsule Take 1 Cap by mouth every seven (7) days.  rosuvastatin (CRESTOR) 10 mg tablet Take 1 Tab by mouth nightly.  diclofenac (VOLTAREN) 1 % gel Apply 4 g to affected area every six (6) hours.  buPROPion XL (WELLBUTRIN XL) 150 mg tablet Take 1 Tab by mouth every morning. No current facility-administered medications for this visit. Vitals:    05/04/21 1103   BP: (!) 126/90   Pulse: 86   Resp: 18   SpO2: 99%   Weight: 152 lb 3.2 oz (69 kg)   Height: 5' 8\" (1.727 m)       I have reviewed the nurses notes, vitals, problem list, allergy list, medical history, family, social history and medications. Review of Symptoms:  General: Pt denies excessive weight gain or loss.  Pt is able to conduct ADL's  HEENT: Denies blurred vision, headaches, epistaxis and difficulty swallowing. Respiratory: Denies shortness of breath, +MOSELEY, denies wheezing or stridor. Cardiovascular: Denies precordial pain, +palpitations, denies edema or PND  Gastrointestinal: Denies poor appetite, indigestion, abdominal pain or blood in stool  Urinary: Denies dysuria, pyuria  Musculoskeletal: Denies pain or swelling from muscles or joints  Neurologic: Denies tremor, +paresthesias, sensory motor disturbance  Skin: Denies rash, itching or texture change. Psych: Denies depression        Physical Exam:      General: Well developed, cooperative, alert in no acute distress, appears states age. HEENT: Supple, No carotid bruits, no JVD, trach is midline. PERRL, EOM intact  Heart:  Normal S1/S2 negative S3 or S4. Regular, no murmur, gallop or rub. Respiratory: Clear bilaterally x 4, no wheezing or rales  Abdomen:   Soft, non-tender, no masses, bowel sounds are active. Extremities:  No edema, normal cap refill, no cyanosis, atraumatic. Neuro: A&Ox3, speech clear, gait stable. Skin: Skin color is normal. No rashes or lesions. Non diaphoretic  Vascular: 2+ pulses symmetric in all extremities    Cardiographics    ECG: SR       Assessment:     Assessment:      Diagnoses and all orders for this visit:    1. Coronary artery disease involving native coronary artery of native heart without angina pectoris  -     NUCLEAR CARDIAC STRESS TEST; Future  -     ECHO ADULT COMPLETE; Future  -     CARDIAC EVENT MONITOR; Future    2. Palpitations  -     NUCLEAR CARDIAC STRESS TEST; Future  -     ECHO ADULT COMPLETE; Future  -     CARDIAC EVENT MONITOR; Future    3. Right-sided muscle weakness  -     NUCLEAR CARDIAC STRESS TEST; Future  -     ECHO ADULT COMPLETE; Future  -     CARDIAC EVENT MONITOR; Future    4.  Familial hypercholesterolemia  -     AMB POC EKG ROUTINE W/ 12 LEADS, INTER & REP  -     NUCLEAR CARDIAC STRESS TEST; Future  -     ECHO ADULT COMPLETE; Future  -     CARDIAC EVENT MONITOR; Future    5. Essential hypertension  -     AMB POC EKG ROUTINE W/ 12 LEADS, INTER & REP  -     NUCLEAR CARDIAC STRESS TEST; Future  -     ECHO ADULT COMPLETE; Future  -     CARDIAC EVENT MONITOR; Future    6. Tobacco use  -     NUCLEAR CARDIAC STRESS TEST; Future  -     ECHO ADULT COMPLETE; Future  -     CARDIAC EVENT MONITOR; Future      Specialty Problems        Cardiology Problems    Essential hypertension        High cholesterol              ICD-10-CM ICD-9-CM    1. Coronary artery disease involving native coronary artery of native heart without angina pectoris  I25.10 414.01 NUCLEAR CARDIAC STRESS TEST      ECHO ADULT COMPLETE      CARDIAC EVENT MONITOR   2. Palpitations  R00.2 785.1 NUCLEAR CARDIAC STRESS TEST      ECHO ADULT COMPLETE      CARDIAC EVENT MONITOR   3. Right-sided muscle weakness  M62.81 728.87 NUCLEAR CARDIAC STRESS TEST      ECHO ADULT COMPLETE      CARDIAC EVENT MONITOR   4. Familial hypercholesterolemia  E78.01 272.0 AMB POC EKG ROUTINE W/ 12 LEADS, INTER & REP      NUCLEAR CARDIAC STRESS TEST      ECHO ADULT COMPLETE      CARDIAC EVENT MONITOR   5. Essential hypertension  I10 401.9 AMB POC EKG ROUTINE W/ 12 LEADS, INTER & REP      NUCLEAR CARDIAC STRESS TEST      ECHO ADULT COMPLETE      CARDIAC EVENT MONITOR   6. Tobacco use  Z72.0 305.1 NUCLEAR CARDIAC STRESS TEST      ECHO ADULT COMPLETE      CARDIAC EVENT MONITOR         PLAN:  1. CAD  CT scan showed moderate CAD, greater than expected for his age. He has MOSELEY and heart fluttering. Risk factors for CAD include HTN, Hyperlipidemia, tobacco and ETOH use, and family hx. EKG SR. Obtain lexiscan stress test as he cant walk, echo. 2. Right sided weakness/possible TIA/heart palpitations  Currently in process of w/u for possible TIA. Has palpitations often, but not daily. Obtain echo and 30 day event monitor    3. Essential hypertension  On Lisinopril/HCTZ. DBP mildly elevated today.      4. Hyperlipidemia  Started on Crestor 2/2020 after 1/17/20 . HDL 87, trig 83. Goal LDL for him is 70 or lower    F/U dependent on results. Patient seen and examined by me with nurse practitioner. Sarah Beth Nicely personally performed all components of the history, physical, and medical decision making and agree with the assessment and plan with minor modifications as noted. Atypical CP, MOSELEY, palpitations and likely CVA. Echo, tina and 2 week event. Perico Xavier MD        Patient was made aware during visit today that all testing completed would be instantaneously available on their MyChart for review. Discussed that these results will be made available to the provider at the same time. They were advised to wait at least 3 business days to allow for provider's interpretation of results with follow-up before calling our office with concerns about their results.

## 2021-05-04 NOTE — LETTER
5/4/2021 Patient: Marcie Luna YOB: 1965 Date of Visit: 5/4/2021 Kevin Townsend MD 
[address] [place] [address] [address] [facility] [employer] Dear Kevin Twonsend MD, Thank you for referring Mr. Mari Mccormack to [address] [place] [address] for evaluation. My notes for this consultation are attached. If you have questions, please do not hesitate to call me. I look forward to following your patient along with you.  
 
 
Sincerely, 
 
Mery Beach MD

## 2021-05-07 ENCOUNTER — TRANSCRIBE ORDER (OUTPATIENT)
Dept: SCHEDULING | Age: 56
End: 2021-05-07

## 2021-05-07 DIAGNOSIS — M47.812 SPONDYLOSIS OF CERVICAL REGION WITHOUT MYELOPATHY OR RADICULOPATHY: ICD-10-CM

## 2021-05-07 DIAGNOSIS — M54.50 LUMBAR PAIN: Primary | ICD-10-CM

## 2021-05-07 DIAGNOSIS — M54.31 RIGHT SIDED SCIATICA: ICD-10-CM

## 2021-05-07 DIAGNOSIS — M54.6 THORACIC BACK PAIN, UNSPECIFIED BACK PAIN LATERALITY, UNSPECIFIED CHRONICITY: ICD-10-CM

## 2021-05-07 DIAGNOSIS — M47.26 OSTEOARTHRITIS OF SPINE WITH RADICULOPATHY, LUMBAR REGION: ICD-10-CM

## 2021-05-07 DIAGNOSIS — M54.2 NECK PAIN: ICD-10-CM

## 2021-05-17 ENCOUNTER — OFFICE VISIT (OUTPATIENT)
Dept: NEUROLOGY | Age: 56
End: 2021-05-17
Payer: COMMERCIAL

## 2021-05-17 DIAGNOSIS — G56.03 BILATERAL CARPAL TUNNEL SYNDROME: ICD-10-CM

## 2021-05-17 PROCEDURE — 95886 MUSC TEST DONE W/N TEST COMP: CPT | Performed by: PSYCHIATRY & NEUROLOGY

## 2021-05-17 PROCEDURE — 95913 NRV CNDJ TEST 13/> STUDIES: CPT | Performed by: PSYCHIATRY & NEUROLOGY

## 2021-05-17 NOTE — PROCEDURES
ELECTRODIAGNOSTIC REPORT      Test Date:  2021    Patient: Marisol Moffett : 1965 Physician: Mimi Vnicent M.D.   ID#: [id number] SEX: Male Ref. Phys: Mimi Vincent M.D. Patient History / Exam:  64 yo male with a medical history of tia and degenerative disc disease who presents with right hemisensory loss. EMG & NCV Findings:  Evaluation of the right Fibular motor nerve showed normal distal onset latency (4.6 ms), normal amplitude (6.0 mV), normal conduction velocity (B Fib-Ankle, 43 m/s), and normal conduction velocity (Poplt-B Fib, 43 m/s). The left median motor and the right median motor nerves showed normal distal onset latency (L3.8, R4.5 ms), normal amplitude (L9.0, R7.6 mV), and normal conduction velocity (Elbow-Wrist, L52, R49 m/s). The right tibial motor nerve showed normal distal onset latency (3.7 ms), normal amplitude (11.7 mV), and normal conduction velocity (Knee-Ankle, 48 m/s). The right ulnar motor nerve showed normal distal onset latency (3.1 ms), normal amplitude (15.9 mV), decreased conduction velocity (Wrist-Abd Dig Minimi, 26 m/s), normal conduction velocity (B Elbow-Wrist, 50 m/s), and normal conduction velocity (A Elbow-B Elbow, 50 m/s). The right median sensory nerve showed prolonged distal peak latency (4.4 ms) and normal amplitude (13.9 µV). The right radial sensory, the right sural sensory, and the right ulnar sensory nerves showed normal distal peak latency (R2.5, R4.3, R3.4 ms) and normal amplitude (R15.9, R20.0, R11.5 µV). The right Sup Fibular sensory nerve showed normal distal peak latency (3.3 ms), normal amplitude (14.4 µV), and normal conduction velocity (Lower leg-Lat ankle, 43 m/s).   The left median/ulnar (palm) comparison nerve showed prolonged distal peak latency (Median Palm, 2.5 ms), reduced amplitude (Median Palm, 19.6 µV), normal distal peak latency (Ulnar Palm, 1.9 ms), normal amplitude (Ulnar Palm, 15.3 µV), and abnormal peak latency difference (Median Palm-Ulnar Palm, 0.6 ms). The right median/ulnar (palm) comparison nerve showed prolonged distal peak latency (Median Palm, 2.5 ms), reduced amplitude (Median Palm, 15.9 µV), normal distal peak latency (Ulnar Palm, 1.7 ms), reduced amplitude (Ulnar Palm, 9.1 µV), and abnormal peak latency difference (Median Palm-Ulnar Palm, 0.8 ms). All left vs. right side differences were within normal limits. All examined muscles (as indicated in the following table) showed no evidence of electrical instability. Impression  Mild bilateral carpal tunnel syndrome. No evidence of radiculopathy.          ___________________________  Jovita Shaikh M.D.    Nerve Conduction Studies  Anti Sensory Summary Table     Stim Site NR Onset (ms) Peak (ms) O-P Amp (µV) Norm Peak (ms) Norm O-P Amp Site1 Site2 Dist (cm) Norm Dwight (m/s)   Right Median Anti Sensory (2nd Digit)  32.2°C   Wrist    2.2 4.4 13.9 <4 >11 Wrist 2nd Digit 14.0    Right Radial Anti Sensory (Base 1st Digit)  32.2°C   Wrist    1.7 2.5 15.9 <2.9 >15 Wrist Base 1st Digit 10.0    Right Sup Fibular Anti Sensory (Lat ankle)  32.2°C   Lower leg    2.3 3.3 14.4 <4.4 >5.0 Lower leg Lat ankle 10.0 >32   Right Sural Anti Sensory (Lat Mall)  32.2°C   Calf    3.2 4.3 20.0 <4.5 >4.0 Calf Lat Mall 14.0    Right Ulnar Anti Sensory (5th Digit)  32.2°C   Wrist    2.6 3.4 11.5 <4.0 >10 Wrist 5th Digit 14.0      Motor Summary Table     Stim Site NR Onset (ms) Norm Onset (ms) O-P Amp (mV) Norm O-P Amp P-T Amp (mV) Site1 Site2 Dist (cm) Dwight (m/s)   Right Fibular Motor (Ext Dig Brev)  32.2°C   Ankle    4.6 <6.5 6.0 >2.6  Ankle Ext Dig Brev 8.0 17   B Fib    11.6  5.4   B Fib Ankle 30.0 43   Poplt    13.9  5.2   Poplt B Fib 10.0 43   Left Median Motor (Abd Poll Brev)  32.2°C   Wrist    3.8 <4.5 9.0 >4.1  Wrist Abd Poll Brev 8.0 21   Elbow    8.4  8.4   Elbow Wrist 24.0 52   Right Median Motor (Abd Poll Brev)  32.2°C   Wrist    4.5 <4.5 7.6 >4.1  Wrist Abd Poll Brev 8.0 18 Elbow    9.3  7.5   Elbow Wrist 23.3 49   Right Tibial Motor (Abd Nunez Brev)  32.2°C   Ankle    3.7 <6.1 11.7 >4.4  Ankle Abd Nunez Brev 8.0 22   Knee    12.2  7.8   Knee Ankle 41.0 48   Right Ulnar Motor (Abd Dig Minimi)  32.2°C   Wrist    3.1 <3.1 15.9 >7.0  Wrist Abd Dig Minimi 8.0 26   B Elbow    7.3  14.4   B Elbow Wrist 21.0 50   A Elbow    9.3  13.5   A Elbow B Elbow 10.0 50     Comparison Summary Table     Stim Site NR Peak (ms) P-T Amp (µV) Site1 Site2 Dist (cm) Delta-P (ms)   Left Median/Ulnar Palm Comparison (Wrist)  32.2°C   Median Palm    2.5 18.4 Median Palm Ulnar Palm 8.0 0.6   Ulnar Palm    1.9 14.1       Right Median/Ulnar Palm Comparison (Wrist)  32.2°C   Median Palm    2.5 24.7 Median Palm Ulnar Palm 8.0 0.8   Ulnar Palm    1.7 11.1           EMG     Side Muscle Nerve Root Ins Act Fibs Psw Recrt Duration Amp Poly Comment   Left Abd Poll Brev Median C8-T1 Nml Nml Nml Nml Nml Nml Nml    Left ABD Dig Min Ulnar C8-T1 Nml Nml Nml Nml Nml Nml Nml    Left 1stDorInt Ulnar C8-T1 Nml Nml Nml Nml Nml Nml Nml    Left PronatorTeres Median C6-7 Nml Nml Nml Nml Nml Nml Nml    Left Biceps Musculocut C5-6 Nml Nml Nml Nml Nml Nml Nml    Left Triceps Radial C6-7-8 Nml Nml Nml Nml Nml Nml Nml    Left Mid Cerv Parasp Rami C5,6 Nml Nml Nml Nml Nml Nml Nml    Left Lower Cerv Parasp Rami C7,T1 Nml Nml Nml Nml Nml Nml Nml    Right Abd Poll Brev Median C8-T1 Nml Nml Nml Nml Nml Nml Nml    Right ABD Dig Min Ulnar C8-T1 Nml Nml Nml Nml Nml Nml Nml    Right 1stDorInt Ulnar C8-T1 Nml Nml Nml Nml Nml Nml Nml    Right PronatorTeres Median C6-7 Nml Nml Nml Nml Nml Nml Nml    Right Biceps Musculocut C5-6 Nml Nml Nml Nml Nml Nml Nml    Right Triceps Radial C6-7-8 Nml Nml Nml Nml Nml Nml Nml    Right Mid Cerv Parasp Rami C5,6 Nml Nml Nml Nml Nml Nml Nml    Right Lower Cerv Parasp Rami C7,T1 Nml Nml Nml Nml Nml Nml Nml      Waveforms:                              a

## 2021-05-26 ENCOUNTER — TELEPHONE (OUTPATIENT)
Dept: FAMILY MEDICINE CLINIC | Age: 56
End: 2021-05-26

## 2021-05-26 ENCOUNTER — OFFICE VISIT (OUTPATIENT)
Dept: FAMILY MEDICINE CLINIC | Age: 56
End: 2021-05-26
Payer: COMMERCIAL

## 2021-05-26 ENCOUNTER — HOSPITAL ENCOUNTER (OUTPATIENT)
Dept: MRI IMAGING | Age: 56
Discharge: HOME | End: 2021-05-26
Attending: PSYCHIATRY & NEUROLOGY
Payer: COMMERCIAL

## 2021-05-26 VITALS
RESPIRATION RATE: 18 BRPM | HEART RATE: 86 BPM | DIASTOLIC BLOOD PRESSURE: 105 MMHG | BODY MASS INDEX: 23.08 KG/M2 | WEIGHT: 152.3 LBS | HEIGHT: 68 IN | OXYGEN SATURATION: 98 % | SYSTOLIC BLOOD PRESSURE: 161 MMHG

## 2021-05-26 DIAGNOSIS — H92.11 EAR DISCHARGE OF RIGHT EAR: ICD-10-CM

## 2021-05-26 DIAGNOSIS — R56.9 SEIZURE (HCC): ICD-10-CM

## 2021-05-26 DIAGNOSIS — G45.9 TRANSIENT ISCHEMIC ATTACK (TIA): ICD-10-CM

## 2021-05-26 DIAGNOSIS — Z72.0 TOBACCO ABUSE DISORDER: ICD-10-CM

## 2021-05-26 DIAGNOSIS — Z71.89 ADVICE GIVEN ABOUT COVID-19 VIRUS INFECTION: ICD-10-CM

## 2021-05-26 DIAGNOSIS — H91.91 HEARING LOSS ASSOCIATED WITH SYNDROME OF RIGHT EAR: Primary | ICD-10-CM

## 2021-05-26 PROCEDURE — 70544 MR ANGIOGRAPHY HEAD W/O DYE: CPT

## 2021-05-26 PROCEDURE — 99213 OFFICE O/P EST LOW 20 MIN: CPT | Performed by: FAMILY MEDICINE

## 2021-05-26 PROCEDURE — 70551 MRI BRAIN STEM W/O DYE: CPT

## 2021-05-26 RX ORDER — CIPROFLOXACIN AND DEXAMETHASONE 3; 1 MG/ML; MG/ML
4 SUSPENSION/ DROPS AURICULAR (OTIC) 2 TIMES DAILY
Qty: 7.5 ML | Refills: 0 | Status: SHIPPED | OUTPATIENT
Start: 2021-05-26 | End: 2021-06-02

## 2021-05-26 RX ORDER — AMOXICILLIN AND CLAVULANATE POTASSIUM 875; 125 MG/1; MG/1
1 TABLET, FILM COATED ORAL 2 TIMES DAILY
Qty: 20 TABLET | Refills: 0 | Status: SHIPPED | OUTPATIENT
Start: 2021-05-26 | End: 2021-06-05

## 2021-05-26 NOTE — TELEPHONE ENCOUNTER
Returned call to pt,  unable to hear out of left ear x 1 day, no muffled sounds, denies pain, denies congestion
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
## 2021-05-26 NOTE — PROGRESS NOTES
Chief Complaint   Patient presents with    Hearing Problem     hearing loss     1. Have you been to the ER, urgent care clinic since your last visit? Hospitalized since your last visit? No    2. Have you seen or consulted any other health care providers outside of the [address] [place] [address] since your last visit? Include any pap smears or colon screening. No     verified patient with two type of identifiers.  c/o hearing loss to left ear x 1-2 days, tried flushing but has not helped,  Denies ear pain, denies congestion  Has not taken blood pressure meds today

## 2021-06-10 ENCOUNTER — ANCILLARY PROCEDURE (OUTPATIENT)
Dept: CARDIOLOGY CLINIC | Age: 56
End: 2021-06-10
Payer: COMMERCIAL

## 2021-06-10 ENCOUNTER — CLINICAL SUPPORT (OUTPATIENT)
Dept: CARDIOLOGY CLINIC | Age: 56
End: 2021-06-10

## 2021-06-10 ENCOUNTER — ANCILLARY PROCEDURE (OUTPATIENT)
Dept: CARDIOLOGY CLINIC | Age: 56
End: 2021-06-10

## 2021-06-10 VITALS
BODY MASS INDEX: 21.28 KG/M2 | WEIGHT: 152 LBS | DIASTOLIC BLOOD PRESSURE: 88 MMHG | HEIGHT: 71 IN | SYSTOLIC BLOOD PRESSURE: 134 MMHG

## 2021-06-10 VITALS
BODY MASS INDEX: 23.04 KG/M2 | WEIGHT: 152 LBS | SYSTOLIC BLOOD PRESSURE: 161 MMHG | HEIGHT: 68 IN | DIASTOLIC BLOOD PRESSURE: 105 MMHG

## 2021-06-10 DIAGNOSIS — M62.81 RIGHT-SIDED MUSCLE WEAKNESS: ICD-10-CM

## 2021-06-10 DIAGNOSIS — I10 ESSENTIAL HYPERTENSION: ICD-10-CM

## 2021-06-10 DIAGNOSIS — Z72.0 TOBACCO USE: ICD-10-CM

## 2021-06-10 DIAGNOSIS — I25.10 CORONARY ARTERY DISEASE INVOLVING NATIVE CORONARY ARTERY OF NATIVE HEART WITHOUT ANGINA PECTORIS: ICD-10-CM

## 2021-06-10 DIAGNOSIS — E78.01 FAMILIAL HYPERCHOLESTEROLEMIA: ICD-10-CM

## 2021-06-10 DIAGNOSIS — R00.2 PALPITATIONS: ICD-10-CM

## 2021-06-10 DIAGNOSIS — R00.2 PALPITATIONS: Primary | ICD-10-CM

## 2021-06-10 LAB
ECHO AO ROOT DIAM: 3.8 CM
ECHO AV PEAK GRADIENT: 5.11 MMHG
ECHO AV PEAK VELOCITY: 112.98 CM/S
ECHO EST RA PRESSURE: 3 MMHG
ECHO LA AREA 4C: 16.36 CM2
ECHO LA MAJOR AXIS: 3.66 CM
ECHO LA MINOR AXIS: 2.01 CM
ECHO LA VOL 2C: 57.82 ML (ref 18–58)
ECHO LA VOL 4C: 36.13 ML (ref 18–58)
ECHO LA VOL BP: 51.24 ML (ref 18–58)
ECHO LA VOL/BSA BIPLANE: 28.15 ML/M2 (ref 16–28)
ECHO LA VOLUME INDEX A2C: 31.77 ML/M2 (ref 16–28)
ECHO LA VOLUME INDEX A4C: 19.85 ML/M2 (ref 16–28)
ECHO LV E' LATERAL VELOCITY: 11.16 CM/S
ECHO LV E' SEPTAL VELOCITY: 9.37 CM/S
ECHO LV GLOBAL LONGITUDINAL STRAIN (GLS): -17.9 PERCENT
ECHO LV INTERNAL DIMENSION DIASTOLIC: 4.53 CM (ref 4.2–5.9)
ECHO LV INTERNAL DIMENSION SYSTOLIC: 3.46 CM
ECHO LV ISOVOLUMETRIC RELAXATION TIME (IVRT): 76.12 MS
ECHO LV IVSD: 0.96 CM (ref 0.6–1)
ECHO LV MASS 2D: 146.5 G (ref 88–224)
ECHO LV MASS INDEX 2D: 80.5 G/M2 (ref 49–115)
ECHO LV POSTERIOR WALL DIASTOLIC: 0.96 CM (ref 0.6–1)
ECHO LVOT PEAK GRADIENT: 4.11 MMHG
ECHO LVOT PEAK VELOCITY: 101.41 CM/S
ECHO MV "A" WAVE DURATION: 119.89 MS
ECHO MV A VELOCITY: 53.29 CM/S
ECHO MV AREA PHT: 4.08 CM2
ECHO MV E DECELERATION TIME (DT): 185.88 MS
ECHO MV E VELOCITY: 67.16 CM/S
ECHO MV E/A RATIO: 1.26
ECHO MV E/E' LATERAL: 6.02
ECHO MV E/E' RATIO (AVERAGED): 6.59
ECHO MV E/E' SEPTAL: 7.17
ECHO MV PRESSURE HALF TIME (PHT): 53.91 MS
ECHO PVEIN A DURATION: 87.54 MS
ECHO PVEIN A VELOCITY: 26.89 CM/S
ECHO RIGHT VENTRICULAR SYSTOLIC PRESSURE (RVSP): 19.24 MMHG
ECHO RV TAPSE: 1.93 CM (ref 1.5–2)
ECHO TV REGURGITANT MAX VELOCITY: 201.5 CM/S
ECHO TV REGURGITANT PEAK GRADIENT: 16.24 MMHG
GLOBAL LONGITUDINAL STRAIN 2 CHAMBER: -21.6 PERCENT
GLOBAL LONGITUDINAL STRAIN 4 CHAMBER: -16.7 PERCENT
GLOBAL LONGITUDINAL STRAIN LONG AXIS: -15.3 PERCENT

## 2021-06-10 PROCEDURE — 93270 REMOTE 30 DAY ECG REV/REPORT: CPT | Performed by: INTERNAL MEDICINE

## 2021-06-10 PROCEDURE — 93272 ECG/REVIEW INTERPRET ONLY: CPT | Performed by: INTERNAL MEDICINE

## 2021-06-10 PROCEDURE — A9502 TC99M TETROFOSMIN: HCPCS | Performed by: INTERNAL MEDICINE

## 2021-06-10 PROCEDURE — 93306 TTE W/DOPPLER COMPLETE: CPT | Performed by: INTERNAL MEDICINE

## 2021-06-10 PROCEDURE — 93015 CV STRESS TEST SUPVJ I&R: CPT | Performed by: INTERNAL MEDICINE

## 2021-06-10 PROCEDURE — 78452 HT MUSCLE IMAGE SPECT MULT: CPT | Performed by: INTERNAL MEDICINE

## 2021-06-10 NOTE — PROGRESS NOTES
Patient received a 30 day event monitor. Instructions given verbally as well as an instruction sheet. Pt verbalized understanding.     [facility] Event Monitoring

## 2021-06-11 LAB
STRESS BASELINE DIAS BP: 88 MMHG
STRESS BASELINE HR: 71 BPM
STRESS BASELINE SYS BP: 134 MMHG
STRESS PEAK DIAS BP: 90 MMHG
STRESS PEAK SYS BP: 146 MMHG
STRESS PERCENT HR ACHIEVED: 71 %
STRESS POST PEAK HR: 117 BPM
STRESS RATE PRESSURE PRODUCT: NORMAL BPM*MMHG
STRESS ST DEPRESSION: 0 MM
STRESS ST ELEVATION: 0 MM
STRESS TARGET HR: 164 BPM

## 2021-06-11 NOTE — PROGRESS NOTES
Echo shows normal heart pumping strength, mild aortic valve leakiness. It does shows a mild opening called a patent foramen ovale, sometimes this can be an origin of blood clots/TIA events. It is worthwhile for him to start a blood thinner to prevent a stroke. He can take either Xarelto 20mg or Eliquis 5mg bid dependent on his insurance. His stress test is negative for indications of blood flow abnormalities to the heart. Follow up in 6 months.

## 2021-06-15 ENCOUNTER — TELEPHONE (OUTPATIENT)
Dept: CARDIOLOGY CLINIC | Age: 56
End: 2021-06-15

## 2021-06-15 NOTE — TELEPHONE ENCOUNTER
Spoke with patient. Verified patient with two patient identifiers. He prefers once a day. Will send Rx for Xarelto 20 mg every day with dinner. Patient verbalized understanding.

## 2021-06-15 NOTE — TELEPHONE ENCOUNTER
Spoke with patient. Verified patient with two patient identifiers. Advised EST negative. Advised echo shows patent foramen ovale. Discussed in detail, blood going between top chambers of heart. History of TIA's. Needs to start either Xarelto 20 mg every day or Eliquis 5 mg BID. Pt is calling his insurance to see which one is on formulary, will call us back to let us know so we can send in to pharmacy. FU in 6 months. Patient verbalized understanding.

## 2021-06-15 NOTE — TELEPHONE ENCOUNTER
----- Message from Aman Murillo NP sent at 6/11/2021  1:41 PM EDT -----  Echo shows normal heart pumping strength, mild aortic valve leakiness. It does shows a mild opening called a patent foramen ovale, sometimes this can be an origin of blood clots/TIA events. It is worthwhile for him to start a blood thinner to prevent a stroke. He can take either Xarelto 20mg or Eliquis 5mg bid dependent on his insurance. His stress test is negative for indications of blood flow abnormalities to the heart. Follow up in 6 months.

## 2021-06-15 NOTE — TELEPHONE ENCOUNTER
Patient called to let us know that his insurance is covering both the Xarelto 20 mg every day and Eliquis 5 mg BID. Please call patient to let him know when it has been sent to the pharmacy. Please [facility] to [facility]. [phone].     Thanks,  Mery Benitez

## 2021-06-16 ENCOUNTER — TELEPHONE (OUTPATIENT)
Dept: CARDIOLOGY CLINIC | Age: 56
End: 2021-06-16

## 2021-06-16 NOTE — TELEPHONE ENCOUNTER
Spoke with patient. Verified patient with two patient identifiers. Advised he was only to be on the Xarelto 20 mg every day with dinner. Discussed again why he is taking this. Pt very relieved and states he understands everything now. Advised to make his appt for 6 mo. Patient verbalized understanding.

## 2021-06-16 NOTE — TELEPHONE ENCOUNTER
Patient called regarding that only Xarelto was called into the pharmacy. Patient is under the impression that it was supposed to be Xarelto and Eliquis. Please give patient a call back regarding this matter. [phone]    Thanks,  Adriano Mota. Could not attach to the other note, because it did not pop up.

## 2021-06-16 NOTE — TELEPHONE ENCOUNTER
Patient wants to know does he need to make a f/u appt.  Concerned about results and taking new medication please call back  [phone]    Thanks Ishan Covington

## 2021-07-12 ENCOUNTER — HOSPITAL ENCOUNTER (OUTPATIENT)
Dept: MRI IMAGING | Age: 56
Discharge: HOME | End: 2021-07-12
Attending: ORTHOPAEDIC SURGERY
Payer: COMMERCIAL

## 2021-07-12 ENCOUNTER — HOSPITAL ENCOUNTER (OUTPATIENT)
Dept: MRI IMAGING | Age: 56
Discharge: HOME | End: 2021-07-12
Attending: PSYCHIATRY & NEUROLOGY
Payer: COMMERCIAL

## 2021-07-12 DIAGNOSIS — M54.31 RIGHT SIDED SCIATICA: ICD-10-CM

## 2021-07-12 DIAGNOSIS — M54.2 NECK PAIN: ICD-10-CM

## 2021-07-12 DIAGNOSIS — M47.26 OSTEOARTHRITIS OF SPINE WITH RADICULOPATHY, LUMBAR REGION: ICD-10-CM

## 2021-07-12 DIAGNOSIS — M54.50 LUMBAR PAIN: ICD-10-CM

## 2021-07-12 DIAGNOSIS — G45.9 TRANSIENT ISCHEMIC ATTACK (TIA): ICD-10-CM

## 2021-07-12 DIAGNOSIS — M54.6 THORACIC BACK PAIN, UNSPECIFIED BACK PAIN LATERALITY, UNSPECIFIED CHRONICITY: ICD-10-CM

## 2021-07-12 DIAGNOSIS — M47.812 SPONDYLOSIS OF CERVICAL REGION WITHOUT MYELOPATHY OR RADICULOPATHY: ICD-10-CM

## 2021-07-12 PROCEDURE — 72148 MRI LUMBAR SPINE W/O DYE: CPT

## 2021-07-12 PROCEDURE — 70547 MR ANGIOGRAPHY NECK W/O DYE: CPT

## 2021-07-13 ENCOUNTER — TELEPHONE (OUTPATIENT)
Dept: CARDIOLOGY CLINIC | Age: 56
End: 2021-07-13

## 2021-07-13 NOTE — TELEPHONE ENCOUNTER
----- Message from Harris Kauffman NP sent at 7/13/2021  9:09 AM EDT -----  Monitor shows no atrial fibrillation, only a few early beats which we all have and would not cause stroke like symptoms.  No changes to his treatment

## 2021-07-13 NOTE — PROGRESS NOTES
Monitor shows no atrial fibrillation, only a few early beats which we all have and would not cause stroke like symptoms.  No changes to his treatment

## 2021-07-13 NOTE — TELEPHONE ENCOUNTER
Verified patient with two patient identifiers. Spoke with patient. Discussed monitor showed no atrial fibrillation, only a few beats which is norm. Would not cause stroke like symptoms. No changes in treatment. Patient verbalized understanding.

## 2021-10-28 ENCOUNTER — TELEPHONE (OUTPATIENT)
Dept: CARDIOLOGY CLINIC | Age: 56
End: 2021-10-28

## 2021-10-28 NOTE — TELEPHONE ENCOUNTER
He is correct, would not take his Xarelto for 72 hours prior to procedure. Resume when told he is safe to do so.

## 2021-10-28 NOTE — TELEPHONE ENCOUNTER
Patient is having an epidural steroid injection on 11.13.2021 and wants to know if it is safe to continue taking his Xarelto while doing the injections. Patient states that he was doing some research and he can see that it is dangerous.           Callback Number: [phone]          Thanks,  Jacoby Acevedo

## 2021-10-28 NOTE — TELEPHONE ENCOUNTER
Spoke with patient. Verified patient with two patient identifiers. Advised patient per NP Nola Mildredanthony that he should stop his Xarelto prior to receiving epidural steroid injections; and to resume them when he is told to do so. Informed patient if he had any other questions or concerns to please give the office a call. Patient verbalized understanding.

## 2021-10-29 ENCOUNTER — TELEPHONE (OUTPATIENT)
Dept: NEUROLOGY | Age: 56
End: 2021-10-29

## 2021-10-30 NOTE — TELEPHONE ENCOUNTER
Spoke with patient. Verified patient with two patient identifiers. Advised to hold Xarelto 72 hr prior to epidural steroid injections. Resume when safe to do so. Patient verbalized understanding.

## 2021-11-02 ENCOUNTER — TELEPHONE (OUTPATIENT)
Dept: NEUROLOGY | Age: 56
End: 2021-11-02

## 2021-11-02 NOTE — TELEPHONE ENCOUNTER
Called and spoke with patient. Verified name/. Notified of test results being normal and mailing results to address on file. Patient verbalized understanding.

## 2021-11-02 NOTE — TELEPHONE ENCOUNTER
----- Message from Drea Davis sent at 11/2/2021 10:29 AM EDT -----  Regarding: /telephone  Contact: [phone]  Patient return call    Caller's first and last name and relationship (if not the patient): n/a      Best contact number(s): [phone]      Whose call is being returned: n/a      Details to clarify the request: Missed call from office      Drea Davis

## 2022-03-19 PROBLEM — E78.00 HIGH CHOLESTEROL: Status: ACTIVE | Noted: 2020-02-18

## 2022-03-19 PROBLEM — I10 ESSENTIAL HYPERTENSION: Status: ACTIVE | Noted: 2020-02-18

## 2022-03-23 DIAGNOSIS — E55.9 VITAMIN D DEFICIENCY: ICD-10-CM

## 2022-03-24 RX ORDER — ERGOCALCIFEROL 1.25 MG/1
CAPSULE ORAL
Qty: 4 CAPSULE | Refills: 11 | Status: SHIPPED | OUTPATIENT
Start: 2022-03-24

## 2022-04-22 DIAGNOSIS — I10 HTN, GOAL BELOW 140/90: ICD-10-CM

## 2022-04-22 DIAGNOSIS — E78.2 MIXED HYPERLIPIDEMIA: ICD-10-CM

## 2022-04-25 RX ORDER — LISINOPRIL AND HYDROCHLOROTHIAZIDE 20; 25 MG/1; MG/1
TABLET ORAL
Qty: 90 TABLET | Refills: 2 | Status: SHIPPED | OUTPATIENT
Start: 2022-04-25

## 2022-04-25 RX ORDER — ROSUVASTATIN CALCIUM 10 MG/1
TABLET, COATED ORAL
Qty: 90 TABLET | Refills: 5 | Status: SHIPPED | OUTPATIENT
Start: 2022-04-25

## 2022-05-13 ENCOUNTER — TELEPHONE (OUTPATIENT)
Dept: FAMILY MEDICINE CLINIC | Age: 57
End: 2022-05-13

## 2022-05-13 NOTE — TELEPHONE ENCOUNTER
----- Message from Hakeem sent at 5/13/2022  1:01 PM EDT -----  Subject: Message to Provider    QUESTIONS  Information for Provider? Patient is requesting refills but was having   difficulty remembering what needs refilled at this time  ---------------------------------------------------------------------------  --------------  [address]  What is the best way for the office to contact you? OK to leave message on   voicemail  Preferred Call Back Phone Number? [phone]  ---------------------------------------------------------------------------  --------------  SCRIPT ANSWERS  Relationship to Patient?  Self

## 2022-07-01 NOTE — TELEPHONE ENCOUNTER
Patient called and left message in regards to Xarelto refill. Appears medication has been denied by cardiologist.      Last visit:5/26/21  Next visit:not scheduled   Previous refill: 6/16/21(30+11R)    Requested Prescriptions     Pending Prescriptions Disp Refills    rivaroxaban (Xarelto) 20 mg tab tablet 30 Tablet 11     Sig: Take 1 Tablet by mouth daily (with dinner).  (Patent foramen ovale)     For Pharmacy Admin Tracking Only     Intervention Detail: New Rx: 1, reason: Patient Preference   Time Spent (min): 5

## 2022-09-22 ENCOUNTER — TELEPHONE (OUTPATIENT)
Dept: FAMILY MEDICINE CLINIC | Age: 57
End: 2022-09-22

## 2022-09-22 NOTE — TELEPHONE ENCOUNTER
Returned call to pt. And mother in law,  requesting to take tylenol as needed. Advised per Dr Cristobal Fraire that pt can take tylenol as needed.

## 2022-09-22 NOTE — TELEPHONE ENCOUNTER
----- Message from Tenisha Hunter sent at 9/22/2022  2:29 PM EDT -----  Subject: Message to Provider    QUESTIONS  Information for Provider? Patients girlfriends mother Si Sandifer called in (got   permission from patient to speak with her) and needs to know if the   patient can take Tylenol as he is on blood thinners but is not feeling   well. They would like an update with a call back. ---------------------------------------------------------------------------  --------------  Mukund GELLER  [phone]; Do not leave any message, patient will call back for answer  ---------------------------------------------------------------------------  --------------  SCRIPT ANSWERS  Relationship to Patient?  Self

## 2023-03-31 DIAGNOSIS — I10 HTN, GOAL BELOW 140/90: ICD-10-CM

## 2023-03-31 DIAGNOSIS — E78.2 MIXED HYPERLIPIDEMIA: ICD-10-CM

## 2023-03-31 RX ORDER — LISINOPRIL AND HYDROCHLOROTHIAZIDE 20; 25 MG/1; MG/1
TABLET ORAL
Qty: 90 TABLET | Refills: 2 | Status: SHIPPED | OUTPATIENT
Start: 2023-03-31

## 2023-04-02 DIAGNOSIS — E55.9 VITAMIN D DEFICIENCY: ICD-10-CM

## 2023-04-03 RX ORDER — ERGOCALCIFEROL 1.25 MG/1
CAPSULE ORAL
Qty: 4 CAPSULE | Refills: 11 | Status: SHIPPED | OUTPATIENT
Start: 2023-04-03

## 2023-05-08 DIAGNOSIS — I10 ESSENTIAL (PRIMARY) HYPERTENSION: ICD-10-CM

## 2023-05-08 DIAGNOSIS — E78.2 MIXED HYPERLIPIDEMIA: ICD-10-CM

## 2023-05-09 RX ORDER — ROSUVASTATIN CALCIUM 10 MG/1
TABLET, COATED ORAL
Qty: 90 TABLET | Refills: 0 | Status: SHIPPED | OUTPATIENT
Start: 2023-05-09

## 2023-05-09 NOTE — TELEPHONE ENCOUNTER
Last appointment: 5/26/21 with MD Annabel Newell  Next appointment: none  Previous refill encounter(s): 4/25/22 #90 with 5 refills    Requested Prescriptions     Pending Prescriptions Disp Refills    rosuvastatin (CRESTOR) 10 MG tablet [Pharmacy Med Name: ROSUVASTATIN CALCIUM 10 MG TAB] 90 tablet 0     Sig: TAKE 1 TABLET BY MOUTH EVERY DAY AT NIGHT

## 2023-07-19 DIAGNOSIS — I10 ESSENTIAL (PRIMARY) HYPERTENSION: ICD-10-CM

## 2023-07-19 DIAGNOSIS — E78.2 MIXED HYPERLIPIDEMIA: ICD-10-CM

## 2023-07-19 RX ORDER — ROSUVASTATIN CALCIUM 10 MG/1
TABLET, COATED ORAL
Qty: 90 TABLET | Refills: 0 | OUTPATIENT
Start: 2023-07-19

## 2023-07-19 NOTE — TELEPHONE ENCOUNTER
Crestor was sent on 5/9/23 for #90      For Pharmacy Admin Tracking Only    Program: Medication Refill  CPA in place:    Recommendation Provided To:    Intervention Detail: New Rx: 1, reason: Patient Preference  Intervention Accepted By:   Kiran Santillan Closed?:    Time Spent (min): 5

## 2023-08-23 DIAGNOSIS — E78.2 MIXED HYPERLIPIDEMIA: ICD-10-CM

## 2023-08-23 DIAGNOSIS — I10 ESSENTIAL (PRIMARY) HYPERTENSION: ICD-10-CM

## 2023-08-24 RX ORDER — ROSUVASTATIN CALCIUM 10 MG/1
10 TABLET, COATED ORAL NIGHTLY
Qty: 30 TABLET | Refills: 0 | Status: SHIPPED | OUTPATIENT
Start: 2023-08-24

## 2023-08-24 NOTE — TELEPHONE ENCOUNTER
Last appointment: 5/26/21  Next appointment: none  Previous refill encounter(s): 5/9/23 #90    Requested Prescriptions     Pending Prescriptions Disp Refills    rosuvastatin (CRESTOR) 10 MG tablet [Pharmacy Med Name: ROSUVASTATIN CALCIUM 10 MG TAB] 30 tablet 0     Sig: Take 1 tablet by mouth at bedtime Patient needs an appointment for further refills         For Pharmacy Admin Tracking Only    Program: Medication Refill  CPA in place:    Recommendation Provided To:    Intervention Detail: New Rx: 1, reason: Patient Preference and Scheduled Appointment  Intervention Accepted By:   Marquise Magallon Closed?:    Time Spent (min): 5

## 2023-09-09 DIAGNOSIS — I10 ESSENTIAL (PRIMARY) HYPERTENSION: ICD-10-CM

## 2023-09-09 DIAGNOSIS — E78.2 MIXED HYPERLIPIDEMIA: ICD-10-CM

## 2023-09-13 RX ORDER — ROSUVASTATIN CALCIUM 10 MG/1
10 TABLET, COATED ORAL NIGHTLY
Qty: 30 TABLET | Refills: 0 | Status: SHIPPED | OUTPATIENT
Start: 2023-09-13

## 2023-09-19 ENCOUNTER — NURSE TRIAGE (OUTPATIENT)
Dept: OTHER | Facility: CLINIC | Age: 58
End: 2023-09-19

## 2023-09-19 NOTE — TELEPHONE ENCOUNTER
[facility]    Received call from [place] at [employer] with Red Flag Complaint. Subjective: Caller states \"Extremely fatigued, no energy for the past 2 months, muscle aches, has a feeling of a lump in the base of his throat that he can't swallow. has to lay down by 2pm every day\"     Current Symptoms: See above    Onset: 2 months ago; gradual    Associated Symptoms: reduced activity, reduced appetite    Pain Severity: 0/10; N/A; none    Temperature: Denies     Recommended disposition: See in Office Today    Care advice provided, patient verbalizes understanding; denies any other questions or concerns; instructed to call back for any new or worsening symptoms. Patient/Caller agrees with recommended disposition; writer provided warm transfer to [employer] at [employer] for appointment scheduling    Attention Provider: Thank you for allowing me to participate in the care of your patient. The patient was connected to triage in response to information provided to the ECC/PSC. Please do not respond through this encounter as the response is not directed to a shared pool.       Reason for Disposition   MODERATE weakness (i.e., interferes with work, school, normal activities) and persists > 3 days    Protocols used: Weakness (Generalized) and Fatigue-ADULT-OH

## 2023-09-21 ENCOUNTER — OFFICE VISIT (OUTPATIENT)
Age: 58
End: 2023-09-21
Payer: COMMERCIAL

## 2023-09-21 VITALS
BODY MASS INDEX: 21.84 KG/M2 | WEIGHT: 156 LBS | HEART RATE: 84 BPM | SYSTOLIC BLOOD PRESSURE: 138 MMHG | TEMPERATURE: 98.3 F | RESPIRATION RATE: 18 BRPM | HEIGHT: 71 IN | DIASTOLIC BLOOD PRESSURE: 89 MMHG | OXYGEN SATURATION: 96 %

## 2023-09-21 DIAGNOSIS — R13.19 ESOPHAGEAL DYSPHAGIA: Primary | ICD-10-CM

## 2023-09-21 DIAGNOSIS — J20.9 ACUTE BRONCHITIS, UNSPECIFIED ORGANISM: ICD-10-CM

## 2023-09-21 DIAGNOSIS — I10 ESSENTIAL (PRIMARY) HYPERTENSION: ICD-10-CM

## 2023-09-21 DIAGNOSIS — E78.2 MIXED HYPERLIPIDEMIA: ICD-10-CM

## 2023-09-21 DIAGNOSIS — F51.02 ADJUSTMENT INSOMNIA: ICD-10-CM

## 2023-09-21 PROCEDURE — 99214 OFFICE O/P EST MOD 30 MIN: CPT | Performed by: FAMILY MEDICINE

## 2023-09-21 PROCEDURE — 3075F SYST BP GE 130 - 139MM HG: CPT | Performed by: FAMILY MEDICINE

## 2023-09-21 PROCEDURE — 3079F DIAST BP 80-89 MM HG: CPT | Performed by: FAMILY MEDICINE

## 2023-09-21 RX ORDER — TRAZODONE HYDROCHLORIDE 150 MG/1
150 TABLET ORAL NIGHTLY
Qty: 30 TABLET | Refills: 1 | Status: SHIPPED | OUTPATIENT
Start: 2023-09-21

## 2023-09-21 RX ORDER — LISINOPRIL AND HYDROCHLOROTHIAZIDE 25; 20 MG/1; MG/1
1 TABLET ORAL DAILY
Qty: 90 TABLET | Refills: 1 | Status: SHIPPED | OUTPATIENT
Start: 2023-09-21

## 2023-09-21 RX ORDER — METHYLPREDNISOLONE 4 MG/1
TABLET ORAL
Qty: 21 TABLET | Refills: 0 | Status: SHIPPED | OUTPATIENT
Start: 2023-09-21

## 2023-09-21 RX ORDER — DOXYCYCLINE HYCLATE 100 MG
100 TABLET ORAL 2 TIMES DAILY
Qty: 20 TABLET | Refills: 0 | Status: SHIPPED | OUTPATIENT
Start: 2023-09-21 | End: 2023-10-01

## 2023-09-21 RX ORDER — ROSUVASTATIN CALCIUM 10 MG/1
10 TABLET, COATED ORAL NIGHTLY
Qty: 90 TABLET | Refills: 1 | Status: SHIPPED | OUTPATIENT
Start: 2023-09-21

## 2023-09-21 RX ORDER — FLUTICASONE PROPIONATE AND SALMETEROL 250; 50 UG/1; UG/1
1 POWDER RESPIRATORY (INHALATION) EVERY 12 HOURS
Qty: 60 EACH | Refills: 3 | Status: SHIPPED | OUTPATIENT
Start: 2023-09-21

## 2023-09-21 SDOH — ECONOMIC STABILITY: FOOD INSECURITY: WITHIN THE PAST 12 MONTHS, THE FOOD YOU BOUGHT JUST DIDN'T LAST AND YOU DIDN'T HAVE MONEY TO GET MORE.: NEVER TRUE

## 2023-09-21 SDOH — ECONOMIC STABILITY: INCOME INSECURITY: HOW HARD IS IT FOR YOU TO PAY FOR THE VERY BASICS LIKE FOOD, HOUSING, MEDICAL CARE, AND HEATING?: NOT HARD AT ALL

## 2023-09-21 SDOH — ECONOMIC STABILITY: FOOD INSECURITY: WITHIN THE PAST 12 MONTHS, YOU WORRIED THAT YOUR FOOD WOULD RUN OUT BEFORE YOU GOT MONEY TO BUY MORE.: NEVER TRUE

## 2023-09-21 SDOH — ECONOMIC STABILITY: HOUSING INSECURITY
IN THE LAST 12 MONTHS, WAS THERE A TIME WHEN YOU DID NOT HAVE A STEADY PLACE TO SLEEP OR SLEPT IN A SHELTER (INCLUDING NOW)?: NO

## 2023-09-21 ASSESSMENT — PATIENT HEALTH QUESTIONNAIRE - PHQ9
SUM OF ALL RESPONSES TO PHQ QUESTIONS 1-9: 8
7. TROUBLE CONCENTRATING ON THINGS, SUCH AS READING THE NEWSPAPER OR WATCHING TELEVISION: 0
2. FEELING DOWN, DEPRESSED OR HOPELESS: 3
SUM OF ALL RESPONSES TO PHQ QUESTIONS 1-9: 8
9. THOUGHTS THAT YOU WOULD BE BETTER OFF DEAD, OR OF HURTING YOURSELF: 0
8. MOVING OR SPEAKING SO SLOWLY THAT OTHER PEOPLE COULD HAVE NOTICED. OR THE OPPOSITE, BEING SO FIGETY OR RESTLESS THAT YOU HAVE BEEN MOVING AROUND A LOT MORE THAN USUAL: 0
SUM OF ALL RESPONSES TO PHQ QUESTIONS 1-9: 8
3. TROUBLE FALLING OR STAYING ASLEEP: 2
SUM OF ALL RESPONSES TO PHQ QUESTIONS 1-9: 8
6. FEELING BAD ABOUT YOURSELF - OR THAT YOU ARE A FAILURE OR HAVE LET YOURSELF OR YOUR FAMILY DOWN: 0
4. FEELING TIRED OR HAVING LITTLE ENERGY: 2
1. LITTLE INTEREST OR PLEASURE IN DOING THINGS: 0
10. IF YOU CHECKED OFF ANY PROBLEMS, HOW DIFFICULT HAVE THESE PROBLEMS MADE IT FOR YOU TO DO YOUR WORK, TAKE CARE OF THINGS AT HOME, OR GET ALONG WITH OTHER PEOPLE: 1
SUM OF ALL RESPONSES TO PHQ9 QUESTIONS 1 & 2: 3
5. POOR APPETITE OR OVEREATING: 1

## 2023-09-22 LAB
ALBUMIN SERPL-MCNC: 4.6 G/DL (ref 3.5–5)
ALBUMIN/GLOB SERPL: 1.4 (ref 1.1–2.2)
ALP SERPL-CCNC: 87 U/L (ref 45–117)
ALT SERPL-CCNC: 33 U/L (ref 12–78)
ANION GAP SERPL CALC-SCNC: 4 MMOL/L (ref 5–15)
AST SERPL-CCNC: 24 U/L (ref 15–37)
BILIRUB SERPL-MCNC: 0.5 MG/DL (ref 0.2–1)
BUN SERPL-MCNC: 8 MG/DL (ref 6–20)
BUN/CREAT SERPL: 10 (ref 12–20)
CALCIUM SERPL-MCNC: 9.8 MG/DL (ref 8.5–10.1)
CHLORIDE SERPL-SCNC: 100 MMOL/L (ref 97–108)
CHOLEST SERPL-MCNC: 151 MG/DL
CO2 SERPL-SCNC: 29 MMOL/L (ref 21–32)
CREAT SERPL-MCNC: 0.82 MG/DL (ref 0.7–1.3)
ERYTHROCYTE [DISTWIDTH] IN BLOOD BY AUTOMATED COUNT: 13.4 % (ref 11.5–14.5)
FOLATE SERPL-MCNC: 29.2 NG/ML (ref 5–21)
GLOBULIN SER CALC-MCNC: 3.2 G/DL (ref 2–4)
GLUCOSE SERPL-MCNC: 94 MG/DL (ref 65–100)
HCT VFR BLD AUTO: 42.5 % (ref 36.6–50.3)
HDLC SERPL-MCNC: 113 MG/DL
HDLC SERPL: 1.3 (ref 0–5)
HGB BLD-MCNC: 14.5 G/DL (ref 12.1–17)
LDLC SERPL CALC-MCNC: 29 MG/DL (ref 0–100)
MCH RBC QN AUTO: 31.7 PG (ref 26–34)
MCHC RBC AUTO-ENTMCNC: 34.1 G/DL (ref 30–36.5)
MCV RBC AUTO: 92.8 FL (ref 80–99)
NRBC # BLD: 0 K/UL (ref 0–0.01)
NRBC BLD-RTO: 0 PER 100 WBC
PLATELET # BLD AUTO: 311 K/UL (ref 150–400)
PMV BLD AUTO: 10 FL (ref 8.9–12.9)
POTASSIUM SERPL-SCNC: 4.5 MMOL/L (ref 3.5–5.1)
PROT SERPL-MCNC: 7.8 G/DL (ref 6.4–8.2)
RBC # BLD AUTO: 4.58 M/UL (ref 4.1–5.7)
SODIUM SERPL-SCNC: 133 MMOL/L (ref 136–145)
TRIGL SERPL-MCNC: 45 MG/DL
TSH SERPL DL<=0.05 MIU/L-ACNC: 2.61 UIU/ML (ref 0.36–3.74)
VIT B12 SERPL-MCNC: 472 PG/ML (ref 193–986)
VLDLC SERPL CALC-MCNC: 9 MG/DL
WBC # BLD AUTO: 7.5 K/UL (ref 4.1–11.1)

## 2023-10-02 ENCOUNTER — OFFICE VISIT (OUTPATIENT)
Age: 58
End: 2023-10-02
Payer: COMMERCIAL

## 2023-10-02 VITALS
DIASTOLIC BLOOD PRESSURE: 78 MMHG | RESPIRATION RATE: 14 BRPM | BODY MASS INDEX: 22.23 KG/M2 | WEIGHT: 159.4 LBS | OXYGEN SATURATION: 96 % | SYSTOLIC BLOOD PRESSURE: 136 MMHG | HEART RATE: 68 BPM | TEMPERATURE: 98.1 F

## 2023-10-02 DIAGNOSIS — Z23 ENCOUNTER FOR IMMUNIZATION: ICD-10-CM

## 2023-10-02 DIAGNOSIS — F51.02 ADJUSTMENT INSOMNIA: ICD-10-CM

## 2023-10-02 DIAGNOSIS — I10 ESSENTIAL HYPERTENSION: Primary | ICD-10-CM

## 2023-10-02 DIAGNOSIS — E78.00 HIGH CHOLESTEROL: ICD-10-CM

## 2023-10-02 PROBLEM — I25.84 CALCIFICATION OF CORONARY ARTERY: Status: ACTIVE | Noted: 2022-09-14

## 2023-10-02 PROBLEM — Q21.12 PATENT FORAMEN OVALE: Status: ACTIVE | Noted: 2022-07-29

## 2023-10-02 PROBLEM — F41.9 ANXIETY: Status: ACTIVE | Noted: 2021-10-27

## 2023-10-02 PROBLEM — M85.80 OSTEOPENIA: Status: ACTIVE | Noted: 2021-10-27

## 2023-10-02 PROBLEM — B19.20 VIRAL HEPATITIS C: Status: ACTIVE | Noted: 2021-10-27

## 2023-10-02 PROBLEM — M51.369 DEGENERATION OF LUMBAR INTERVERTEBRAL DISC: Status: ACTIVE | Noted: 2021-10-27

## 2023-10-02 PROBLEM — I25.10 CALCIFICATION OF CORONARY ARTERY: Status: ACTIVE | Noted: 2022-09-14

## 2023-10-02 PROBLEM — D12.6 TUBULAR ADENOMA OF COLON: Status: ACTIVE | Noted: 2021-10-27

## 2023-10-02 PROBLEM — M54.50 LOW BACK PAIN: Status: ACTIVE | Noted: 2021-10-27

## 2023-10-02 PROBLEM — M41.9 LUMBAR SPINE SCOLIOSIS: Status: ACTIVE | Noted: 2021-10-27

## 2023-10-02 PROBLEM — F17.210 CIGARETTE NICOTINE DEPENDENCE WITHOUT COMPLICATION: Status: ACTIVE | Noted: 2023-10-02

## 2023-10-02 PROBLEM — M51.36 DEGENERATION OF LUMBAR INTERVERTEBRAL DISC: Status: ACTIVE | Noted: 2021-10-27

## 2023-10-02 PROCEDURE — 90674 CCIIV4 VAC NO PRSV 0.5 ML IM: CPT | Performed by: FAMILY MEDICINE

## 2023-10-02 PROCEDURE — 99213 OFFICE O/P EST LOW 20 MIN: CPT | Performed by: FAMILY MEDICINE

## 2023-10-02 PROCEDURE — 3075F SYST BP GE 130 - 139MM HG: CPT | Performed by: FAMILY MEDICINE

## 2023-10-02 PROCEDURE — PBSHW INFLUENZA, FLUCELVAX, (AGE 6 MO+), IM, PF, 0.5 ML: Performed by: FAMILY MEDICINE

## 2023-10-02 PROCEDURE — 3078F DIAST BP <80 MM HG: CPT | Performed by: FAMILY MEDICINE

## 2023-10-02 RX ORDER — TRAZODONE HYDROCHLORIDE 150 MG/1
150 TABLET ORAL NIGHTLY
Qty: 30 TABLET | Refills: 5 | Status: SHIPPED | OUTPATIENT
Start: 2023-10-02

## 2023-10-02 ASSESSMENT — PATIENT HEALTH QUESTIONNAIRE - PHQ9
SUM OF ALL RESPONSES TO PHQ QUESTIONS 1-9: 0
2. FEELING DOWN, DEPRESSED OR HOPELESS: 0
1. LITTLE INTEREST OR PLEASURE IN DOING THINGS: 0
SUM OF ALL RESPONSES TO PHQ QUESTIONS 1-9: 0
SUM OF ALL RESPONSES TO PHQ9 QUESTIONS 1 & 2: 0
SUM OF ALL RESPONSES TO PHQ QUESTIONS 1-9: 0
SUM OF ALL RESPONSES TO PHQ QUESTIONS 1-9: 0

## 2023-10-02 NOTE — PROGRESS NOTES
Chief Complaint   Patient presents with    Results     Here to discuss last lab results        1. Have you been to the ER, urgent care clinic since your last visit? Hospitalized since your last visit? No    2. Have you seen or consulted any other health care providers outside of the [address] [place] [address] since your last visit? Include any pap smears or colon screening. No       Health Maintenance Due   Topic Date Due    HIV screen  Never done    Hepatitis C screen  Never done    Colorectal Cancer Screen  Never done    Low dose CT lung screening &/or counseling  2022    Annual Wellness Visit (AWV)  2022      The patient, Nelson Villegas, identity was verified by name and     Per orders of Dr. Ilir Segal , injection of flu vaccine was given in the Left deltoid . Patient tolerated it well. Patient instructed to report any adverse reaction to me immediately.
R-5Normal  3. Encounter for immunization  -     Influenza, FLUCELVAX, (age 10 mo+), IM, PF, 0.5 mL  4. High cholesterol  Stable high HDL, rtc for AWV    Return in about 2 months (around 12/2/2023), or if symptoms worsen or fail to improve. An electronic signature was used to authenticate this note.     --Bailey Nova MD

## 2024-02-17 DIAGNOSIS — E78.2 MIXED HYPERLIPIDEMIA: ICD-10-CM

## 2024-02-17 DIAGNOSIS — I10 ESSENTIAL (PRIMARY) HYPERTENSION: ICD-10-CM

## 2024-02-19 NOTE — TELEPHONE ENCOUNTER
Last appointment: 10/2/23  Next appointment: Advised to follow-up 12/2/23  Previous refill encounter(s): 9/21/23 #90 with 1 refill    Requested Prescriptions     Pending Prescriptions Disp Refills    rosuvastatin (CRESTOR) 10 MG tablet [Pharmacy Med Name: ROSUVASTATIN CALCIUM 10 MG TAB] 90 tablet 1     Sig: Take 1 tablet by mouth at bedtime         For Pharmacy Admin Tracking Only    Program: Medication Refill  CPA in place:    Recommendation Provided To:   Intervention Detail: New Rx: 1, reason: Patient Preference  Intervention Accepted By:   Gap Closed?:    Time Spent (min): 5

## 2024-02-20 RX ORDER — ROSUVASTATIN CALCIUM 10 MG/1
10 TABLET, COATED ORAL NIGHTLY
Qty: 90 TABLET | Refills: 1 | Status: SHIPPED | OUTPATIENT
Start: 2024-02-20

## 2024-03-04 DIAGNOSIS — I10 ESSENTIAL (PRIMARY) HYPERTENSION: ICD-10-CM

## 2024-03-04 DIAGNOSIS — J20.9 ACUTE BRONCHITIS, UNSPECIFIED ORGANISM: ICD-10-CM

## 2024-03-05 RX ORDER — LISINOPRIL AND HYDROCHLOROTHIAZIDE 25; 20 MG/1; MG/1
1 TABLET ORAL DAILY
Qty: 90 TABLET | Refills: 1 | Status: SHIPPED | OUTPATIENT
Start: 2024-03-05

## 2024-03-05 RX ORDER — FLUTICASONE PROPIONATE AND SALMETEROL 50; 250 UG/1; UG/1
POWDER RESPIRATORY (INHALATION)
Qty: 60 EACH | Refills: 3 | Status: SHIPPED | OUTPATIENT
Start: 2024-03-05

## 2024-03-05 NOTE — TELEPHONE ENCOUNTER
Last appointment: 10/2/23  Next appointment: Advised to follow-up 12/2/23  Previous refill encounter(s): 9/21/23 Advair #60 with 3 refills & Prinzide #90 with 1 refill    Requested Prescriptions     Pending Prescriptions Disp Refills    ADVAIR DISKUS 250-50 MCG/ACT AEPB diskus inhaler [Pharmacy Med Name: ADVAIR 250-50 DISKUS] 60 each 3     Sig: INHALE 1 PUFF INTO THE LUNGS IN THE MORNING AND IN THE EVENING    lisinopril-hydroCHLOROthiazide (PRINZIDE;ZESTORETIC) 20-25 MG per tablet [Pharmacy Med Name: LISINOPRIL-HCTZ 20-25 MG TAB] 90 tablet 1     Sig: TAKE 1 TABLET BY MOUTH EVERY DAY         For Pharmacy Admin Tracking Only    Program: Medication Refill  CPA in place:    Recommendation Provided To:   Intervention Detail: New Rx: 2, reason: Patient Preference  Intervention Accepted By:   Gap Closed?:    Time Spent (min): 5

## 2024-05-20 DIAGNOSIS — E55.9 VITAMIN D DEFICIENCY, UNSPECIFIED: ICD-10-CM

## 2024-05-22 NOTE — TELEPHONE ENCOUNTER
Last appointment: 10/2/23  Next appointment: Advised to follow-up 12/2/23  Previous refill encounter(s): 4/3/23    Requested Prescriptions     Pending Prescriptions Disp Refills    vitamin D (ERGOCALCIFEROL) 1.25 MG (44843 UT) CAPS capsule [Pharmacy Med Name: VITAMIN D2 1.25MG(50,000 UNIT)] 12 capsule 0     Sig: Take 1 capsule by mouth Once a week at 5 PM Patient needs an appointment for further refills         For Pharmacy Admin Tracking Only    Program: Medication Refill  CPA in place:    Recommendation Provided To:   Intervention Detail: New Rx: 1, reason: Patient Preference and Scheduled Appointment  Intervention Accepted By:   Gap Closed?:    Time Spent (min): 5

## 2024-05-23 RX ORDER — ERGOCALCIFEROL 1.25 MG/1
50000 CAPSULE ORAL WEEKLY
Qty: 12 CAPSULE | Refills: 0 | Status: SHIPPED | OUTPATIENT
Start: 2024-05-23

## 2024-05-29 ENCOUNTER — CLINICAL DOCUMENTATION (OUTPATIENT)
Age: 59
End: 2024-05-29

## 2024-05-29 NOTE — PROGRESS NOTES
Received call from pt, c/o scratchy throat and bodyaches and just \"dont feel good\" , not available until Friday,  offered virtual appt for 5/31/24 @220pm, informed that Dr Smith does not see respiratory symptoms in office.  Pt declined virtual appointment -disconnected call.

## 2024-05-31 ENCOUNTER — APPOINTMENT (OUTPATIENT)
Facility: HOSPITAL | Age: 59
End: 2024-05-31
Payer: COMMERCIAL

## 2024-05-31 ENCOUNTER — HOSPITAL ENCOUNTER (EMERGENCY)
Facility: HOSPITAL | Age: 59
Discharge: HOME | End: 2024-05-31
Attending: STUDENT IN AN ORGANIZED HEALTH CARE EDUCATION/TRAINING PROGRAM
Payer: COMMERCIAL

## 2024-05-31 VITALS
RESPIRATION RATE: 18 BRPM | OXYGEN SATURATION: 96 % | DIASTOLIC BLOOD PRESSURE: 93 MMHG | HEART RATE: 89 BPM | WEIGHT: 149.91 LBS | TEMPERATURE: 98.1 F | BODY MASS INDEX: 20.91 KG/M2 | SYSTOLIC BLOOD PRESSURE: 146 MMHG

## 2024-05-31 DIAGNOSIS — B34.9 VIRAL SYNDROME: ICD-10-CM

## 2024-05-31 DIAGNOSIS — J20.9 ACUTE BRONCHITIS, UNSPECIFIED ORGANISM: Primary | ICD-10-CM

## 2024-05-31 DIAGNOSIS — R63.4 WEIGHT LOSS: ICD-10-CM

## 2024-05-31 LAB
ALBUMIN SERPL-MCNC: 4.2 G/DL (ref 3.5–5)
ALBUMIN/GLOB SERPL: 1.3 (ref 1.1–2.2)
ALP SERPL-CCNC: 118 U/L (ref 45–117)
ALT SERPL-CCNC: 27 U/L (ref 12–78)
ANION GAP SERPL CALC-SCNC: 6 MMOL/L (ref 5–15)
AST SERPL-CCNC: 20 U/L (ref 15–37)
BASOPHILS # BLD: 0.1 K/UL (ref 0–0.1)
BASOPHILS NFR BLD: 1 % (ref 0–1)
BILIRUB SERPL-MCNC: 0.7 MG/DL (ref 0.2–1)
BUN SERPL-MCNC: 6 MG/DL (ref 6–20)
BUN/CREAT SERPL: 8 (ref 12–20)
CALCIUM SERPL-MCNC: 9.5 MG/DL (ref 8.5–10.1)
CHLORIDE SERPL-SCNC: 104 MMOL/L (ref 97–108)
CO2 SERPL-SCNC: 27 MMOL/L (ref 21–32)
CREAT SERPL-MCNC: 0.76 MG/DL (ref 0.7–1.3)
DEPRECATED S PYO AG THROAT QL EIA: NEGATIVE
DIFFERENTIAL METHOD BLD: ABNORMAL
EOSINOPHIL # BLD: 0.1 K/UL (ref 0–0.4)
EOSINOPHIL NFR BLD: 1 % (ref 0–7)
ERYTHROCYTE [DISTWIDTH] IN BLOOD BY AUTOMATED COUNT: 13.2 % (ref 11.5–14.5)
FLUAV RNA SPEC QL NAA+PROBE: NOT DETECTED
FLUBV RNA SPEC QL NAA+PROBE: NOT DETECTED
GLOBULIN SER CALC-MCNC: 3.3 G/DL (ref 2–4)
GLUCOSE SERPL-MCNC: 94 MG/DL (ref 65–100)
HCT VFR BLD AUTO: 40 % (ref 36.6–50.3)
HGB BLD-MCNC: 14 G/DL (ref 12.1–17)
IMM GRANULOCYTES # BLD AUTO: 0 K/UL (ref 0–0.04)
IMM GRANULOCYTES NFR BLD AUTO: 0 % (ref 0–0.5)
LYMPHOCYTES # BLD: 1.8 K/UL (ref 0.8–3.5)
LYMPHOCYTES NFR BLD: 16 % (ref 12–49)
MCH RBC QN AUTO: 31.8 PG (ref 26–34)
MCHC RBC AUTO-ENTMCNC: 35 G/DL (ref 30–36.5)
MCV RBC AUTO: 90.9 FL (ref 80–99)
MONOCYTES # BLD: 1.7 K/UL (ref 0–1)
MONOCYTES NFR BLD: 14 % (ref 5–13)
NEUTS SEG # BLD: 8 K/UL (ref 1.8–8)
NEUTS SEG NFR BLD: 68 % (ref 32–75)
NRBC # BLD: 0 K/UL (ref 0–0.01)
NRBC BLD-RTO: 0 PER 100 WBC
PLATELET # BLD AUTO: 292 K/UL (ref 150–400)
PMV BLD AUTO: 9.4 FL (ref 8.9–12.9)
POTASSIUM SERPL-SCNC: 3.6 MMOL/L (ref 3.5–5.1)
PROT SERPL-MCNC: 7.5 G/DL (ref 6.4–8.2)
RBC # BLD AUTO: 4.4 M/UL (ref 4.1–5.7)
SARS-COV-2 RNA RESP QL NAA+PROBE: NOT DETECTED
SODIUM SERPL-SCNC: 137 MMOL/L (ref 136–145)
TROPONIN I SERPL HS-MCNC: <4 NG/L (ref 0–76)
WBC # BLD AUTO: 11.6 K/UL (ref 4.1–11.1)

## 2024-05-31 PROCEDURE — 80053 COMPREHEN METABOLIC PANEL: CPT

## 2024-05-31 PROCEDURE — 93005 ELECTROCARDIOGRAM TRACING: CPT | Performed by: STUDENT IN AN ORGANIZED HEALTH CARE EDUCATION/TRAINING PROGRAM

## 2024-05-31 PROCEDURE — 94640 AIRWAY INHALATION TREATMENT: CPT

## 2024-05-31 PROCEDURE — 71046 X-RAY EXAM CHEST 2 VIEWS: CPT

## 2024-05-31 PROCEDURE — 99285 EMERGENCY DEPT VISIT HI MDM: CPT

## 2024-05-31 PROCEDURE — 36415 COLL VENOUS BLD VENIPUNCTURE: CPT

## 2024-05-31 PROCEDURE — 85025 COMPLETE CBC W/AUTO DIFF WBC: CPT

## 2024-05-31 PROCEDURE — 6360000002 HC RX W HCPCS: Performed by: STUDENT IN AN ORGANIZED HEALTH CARE EDUCATION/TRAINING PROGRAM

## 2024-05-31 PROCEDURE — 87070 CULTURE OTHR SPECIMN AEROBIC: CPT

## 2024-05-31 PROCEDURE — 71275 CT ANGIOGRAPHY CHEST: CPT

## 2024-05-31 PROCEDURE — 6370000000 HC RX 637 (ALT 250 FOR IP): Performed by: STUDENT IN AN ORGANIZED HEALTH CARE EDUCATION/TRAINING PROGRAM

## 2024-05-31 PROCEDURE — 6360000004 HC RX CONTRAST MEDICATION: Performed by: STUDENT IN AN ORGANIZED HEALTH CARE EDUCATION/TRAINING PROGRAM

## 2024-05-31 PROCEDURE — 87636 SARSCOV2 & INF A&B AMP PRB: CPT

## 2024-05-31 PROCEDURE — 84484 ASSAY OF TROPONIN QUANT: CPT

## 2024-05-31 PROCEDURE — 87880 STREP A ASSAY W/OPTIC: CPT

## 2024-05-31 RX ORDER — PREDNISONE 20 MG/1
40 TABLET ORAL DAILY
Qty: 10 TABLET | Refills: 0 | Status: SHIPPED | OUTPATIENT
Start: 2024-05-31 | End: 2024-06-05

## 2024-05-31 RX ORDER — IPRATROPIUM BROMIDE AND ALBUTEROL SULFATE 2.5; .5 MG/3ML; MG/3ML
1 SOLUTION RESPIRATORY (INHALATION)
Status: COMPLETED | OUTPATIENT
Start: 2024-05-31 | End: 2024-05-31

## 2024-05-31 RX ORDER — LIDOCAINE HYDROCHLORIDE 20 MG/ML
15 SOLUTION OROPHARYNGEAL PRN
Qty: 100 ML | Refills: 0 | Status: SHIPPED | OUTPATIENT
Start: 2024-05-31

## 2024-05-31 RX ORDER — DEXAMETHASONE SODIUM PHOSPHATE 10 MG/ML
10 INJECTION, SOLUTION INTRAMUSCULAR; INTRAVENOUS ONCE
Status: COMPLETED | OUTPATIENT
Start: 2024-05-31 | End: 2024-05-31

## 2024-05-31 RX ORDER — ALBUTEROL SULFATE 90 UG/1
2 AEROSOL, METERED RESPIRATORY (INHALATION) EVERY 6 HOURS PRN
Qty: 18 G | Refills: 3 | Status: SHIPPED | OUTPATIENT
Start: 2024-05-31

## 2024-05-31 RX ADMIN — IOPAMIDOL 75 ML: 755 INJECTION, SOLUTION INTRAVENOUS at 10:01

## 2024-05-31 RX ADMIN — IPRATROPIUM BROMIDE AND ALBUTEROL SULFATE 1 DOSE: .5; 3 SOLUTION RESPIRATORY (INHALATION) at 10:06

## 2024-05-31 RX ADMIN — DEXAMETHASONE SODIUM PHOSPHATE 10 MG: 10 INJECTION, SOLUTION INTRAMUSCULAR; INTRAVENOUS at 11:29

## 2024-05-31 NOTE — PLAN OF CARE
Problem: Respiratory - Adult  Goal: Achieves optimal ventilation and oxygenation  Outcome: Progressing  Flowsheets (Taken 5/31/2024 1003)  Achieves optimal ventilation and oxygenation:   Assess for changes in respiratory status   Position to facilitate oxygenation and minimize respiratory effort   Respiratory therapy support as indicated   Oxygen supplementation based on oxygen saturation or arterial blood gases   Assess and instruct to report shortness of breath or any respiratory difficulty

## 2024-05-31 NOTE — ED PROVIDER NOTES
\[facility]\"" EMERGENCY DEPT  EMERGENCY DEPARTMENT ENCOUNTER       Pt Name: Cheng Gamez  MRN: [id number]  Birthdate 1965  Date of evaluation: 5/31/2024  Provider: Alvin Cameron MD   PCP: Addison Smith MD  Note Started: 11:21 AM EDT 5/31/24     CHIEF COMPLAINT       Chief Complaint   Patient presents with    Cough     Over past 2 to 3 weeks feeling weak. Found some mold in his bathroom. Pt has progressed to this week coughing especially at night. Sore throat onset 4 days ago     HISTORY OF PRESENT ILLNESS: 1 or more elements      History From: patient, History limited by: none     Cheng Gamez is a 58 y.o. male w pmhx of below presents to the ED with cough and sore throat.  He reports that he has been coughing for the past month, but his symptoms have gotten much worse over the past two weeks.  He is being kept up at night but is not producing any sputum.  He reports that he has a lack of appetite and and has had a significant weight loss over the last month.  He has a history of legionairre's disease, but does not feel as sick as he did when he was diagnosed with pneumonia.  He was told that he likely had COPD, but has stopped taking his previously prescribed inhalers because he did not notice any difference in symptoms.    Please See MDM for Additional Details of the HPI/PMH  Nursing Notes were all reviewed and agreed with or any disagreements were addressed in the HPI.     REVIEW OF SYSTEMS        Positives and Pertinent negatives as per HPI.    PAST HISTORY     Past Medical History:  Past Medical History:   Diagnosis Date    Arthritis     Chronic pain     Degenerative disc disease at L5-S1 level     Depression     Gastrointestinal disorder     mass    Gastrointestinal disorder     pancreatitis    Hepatitis C     2018    Hyperlipidemia     Hypertension     Sciatica     Scoliosis        Past Surgical History:  Past Surgical History:   Procedure Laterality Date    COLONOSCOPY         Family

## 2024-05-31 NOTE — DISCHARGE INSTRUCTIONS
Please make a followup with your primary care physician and a lung doctor.  If you have any new or worsening concerning medical symptoms please return to the emergency department.

## 2024-06-01 LAB
EKG ATRIAL RATE: 78 BPM
EKG DIAGNOSIS: NORMAL
EKG P AXIS: 81 DEGREES
EKG P-R INTERVAL: 154 MS
EKG Q-T INTERVAL: 386 MS
EKG QRS DURATION: 106 MS
EKG QTC CALCULATION (BAZETT): 440 MS
EKG R AXIS: 76 DEGREES
EKG T AXIS: 74 DEGREES
EKG VENTRICULAR RATE: 78 BPM

## 2024-06-02 LAB
BACTERIA SPEC CULT: NORMAL
SERVICE CMNT-IMP: NORMAL

## 2024-06-21 ENCOUNTER — OFFICE VISIT (OUTPATIENT)
Age: 59
End: 2024-06-21
Payer: COMMERCIAL

## 2024-06-21 ENCOUNTER — TELEPHONE (OUTPATIENT)
Age: 59
End: 2024-06-21

## 2024-06-21 VITALS
OXYGEN SATURATION: 98 % | TEMPERATURE: 97.8 F | HEART RATE: 76 BPM | SYSTOLIC BLOOD PRESSURE: 173 MMHG | DIASTOLIC BLOOD PRESSURE: 107 MMHG | BODY MASS INDEX: 21.06 KG/M2 | RESPIRATION RATE: 18 BRPM | WEIGHT: 151 LBS

## 2024-06-21 DIAGNOSIS — I10 ESSENTIAL HYPERTENSION: Primary | ICD-10-CM

## 2024-06-21 DIAGNOSIS — R19.5 POSITIVE COLORECTAL CANCER SCREENING USING COLOGUARD TEST: ICD-10-CM

## 2024-06-21 DIAGNOSIS — I10 ESSENTIAL (PRIMARY) HYPERTENSION: ICD-10-CM

## 2024-06-21 DIAGNOSIS — Z87.891 PERSONAL HISTORY OF TOBACCO USE: ICD-10-CM

## 2024-06-21 DIAGNOSIS — E78.2 MIXED HYPERLIPIDEMIA: ICD-10-CM

## 2024-06-21 DIAGNOSIS — F51.02 ADJUSTMENT INSOMNIA: ICD-10-CM

## 2024-06-21 DIAGNOSIS — Z12.11 SCREENING FOR MALIGNANT NEOPLASM OF COLON: ICD-10-CM

## 2024-06-21 PROCEDURE — 99214 OFFICE O/P EST MOD 30 MIN: CPT | Performed by: FAMILY MEDICINE

## 2024-06-21 RX ORDER — TRAZODONE HYDROCHLORIDE 150 MG/1
150 TABLET ORAL NIGHTLY
Qty: 90 TABLET | Refills: 3 | Status: SHIPPED | OUTPATIENT
Start: 2024-06-21

## 2024-06-21 RX ORDER — ROSUVASTATIN CALCIUM 10 MG/1
10 TABLET, COATED ORAL NIGHTLY
Qty: 90 TABLET | Refills: 3 | Status: SHIPPED | OUTPATIENT
Start: 2024-06-21

## 2024-06-21 RX ORDER — ALBUTEROL SULFATE 90 UG/1
2 AEROSOL, METERED RESPIRATORY (INHALATION) EVERY 6 HOURS PRN
Qty: 18 G | Refills: 3 | Status: SHIPPED | OUTPATIENT
Start: 2024-06-21

## 2024-06-21 ASSESSMENT — PATIENT HEALTH QUESTIONNAIRE - PHQ9
1. LITTLE INTEREST OR PLEASURE IN DOING THINGS: NOT AT ALL
SUM OF ALL RESPONSES TO PHQ QUESTIONS 1-9: 0
2. FEELING DOWN, DEPRESSED OR HOPELESS: NOT AT ALL
SUM OF ALL RESPONSES TO PHQ9 QUESTIONS 1 & 2: 0
SUM OF ALL RESPONSES TO PHQ QUESTIONS 1-9: 0

## 2024-06-21 NOTE — PROGRESS NOTES
Cehng Gamez (:  1965) is a 58 y.o. male,Established patient, here for evaluation of the following chief complaint(s):  Discuss Labs (Cologuard results ), Hypertension, and Follow-up ([facility] ER 24 bronchitis )  Patient presented with history of hypercoagulable state hypertension hypercholesterolemia primary insomnia recently had Cologuard test which became to be positive patient denies any family history of colon cancer has no bleeding also requesting to have for her gastroenterologist evaluation at [facility],      pt with hx of chronic Insomnia with restlessness, for which has no suicidal, no homicidal ideations, patient problem is not falling asleep, the problem is staying asleep,  it gets 1-3 Hours Of sleep, then the pt is up for another 1-2 hours,       pt also present for Bp check , compliant w/ the bp meds, a low salt diet, an  active life style,  , today the pt denies Chest Pain, has no legs swelling no lightheadedness,t     Constitutional: no fever, nl  energy levels, nl sleep patterns, nl appetite, and nl weight fluctuations,  - exercise habits,  nad     HENT: no ear pain or nosebleeds. No blurred vision  Respiratory: no shortness of breath, wheezing cough   Cardiovascular: Has no chest pain, ,and racing heart .   Gastrointestinal: No constipation, diarrhea, nausea and vomiting.   Genitourinary: No frequency.   Musculoskeletal: Negative for joint pain.   Skin: no itching, no rash.   Neurological: Negative for dizziness, no tremors  Psychiatric/Behavioral: no for depression  no nervous/anxious, nl stress levels, and overall emotional well-being .      Constitutional: Well developed, well nourished.  non-toxic in appearance, not diaphoretic.   HEENT: PERRL. EOMI. The left TM is unremarkable. The right TM is unremarkable. No nasal  erythema noted.  THROAT: Posterior pharynx has no erythema, no exudates.    Neck:  no cervical lymphadenopathy. Neck is supple   Cardiovascular: Regular rate and

## 2024-06-21 NOTE — TELEPHONE ENCOUNTER
Seema Calloway  [facility] Clinical Staff  ECC Message to Provider    Relationship to Patient: Other mother-in-law Loida Rocha    Additional Information:  Patient is requesting for the papers that need to be fax out, in order for him to get scheduled for colonoscopy at [facility].  --------------------------------------------------------------------------------------------------------------------------    Call Back Information: OK to leave message on voicemail  Preferred Call Back Number: Phone [phone] (home)  [phone]

## 2024-06-21 NOTE — PROGRESS NOTES
Chief Complaint   Patient presents with    Discuss Labs     Cologuard results     Hypertension    Follow-up     [facility] ER 24 bronchitis        \"Have you been to the ER, urgent care clinic since your last visit?  Hospitalized since your last visit?\"    NO    “Have you seen or consulted any other health care providers outside of [facility] since your last visit?”    NO       Vitals:    24 1002 24 1007   BP: (!) 159/104 (!) 173/107   Site: Left Upper Arm Right Upper Arm   Position: Sitting Sitting   Cuff Size: Large Adult Large Adult   Pulse: 76    Resp: 18    Temp: 97.8 °F (36.6 °C)    TempSrc: Infrared    SpO2: 98%    Weight: 68.5 kg (151 lb)         Health Maintenance Due   Topic Date Due    Low dose CT lung screening &/or counseling  2022        The patient, Cheng Gamez, identity was verified by name and    Discussed with the patient the current USPSTF guidelines released 2021 for screening for lung cancer.    For adults aged 50 to 80 years who have a 20 pack-year smoking history and currently smoke or have quit within the past 15 years the grade B recommendation is to:  Screen for lung cancer with low-dose computed tomography (LDCT) every year.  Stop screening once a person has not smoked for 15 years or has a health problem that limits life expectancy or the ability to have lung surgery.    The patient  reports that he has been smoking cigarettes. He started smoking about 34 years ago. He has a 40.0 pack-year smoking history. He has been exposed to tobacco smoke. He has never used smokeless tobacco.. Discussed with patient the risks and benefits of screening, including over-diagnosis, false positive rate, and total radiation exposure.  The patient currently exhibits no signs or symptoms suggestive of lung cancer.  Discussed with patient the importance of compliance with yearly annual lung cancer screenings and willingness to undergo diagnosis and treatment

## 2024-06-24 ENCOUNTER — TELEPHONE (OUTPATIENT)
Age: 59
End: 2024-06-24

## 2024-06-24 NOTE — TELEPHONE ENCOUNTER
Sonal with [facility] is requesting a referral for colonoscopy,Demo,office notes and med list she can be reached @ [phone] and ( q ) [phone]

## 2024-07-03 ENCOUNTER — HOSPITAL ENCOUNTER (EMERGENCY)
Facility: HOSPITAL | Age: 59
Discharge: HOME | End: 2024-07-03
Payer: COMMERCIAL

## 2024-07-03 VITALS
OXYGEN SATURATION: 98 % | HEART RATE: 81 BPM | WEIGHT: 149.91 LBS | DIASTOLIC BLOOD PRESSURE: 89 MMHG | TEMPERATURE: 98.7 F | BODY MASS INDEX: 20.91 KG/M2 | RESPIRATION RATE: 16 BRPM | SYSTOLIC BLOOD PRESSURE: 163 MMHG

## 2024-07-03 DIAGNOSIS — L02.91 ABSCESS: Primary | ICD-10-CM

## 2024-07-03 PROCEDURE — 99283 EMERGENCY DEPT VISIT LOW MDM: CPT

## 2024-07-03 RX ORDER — SULFAMETHOXAZOLE AND TRIMETHOPRIM 800; 160 MG/1; MG/1
1 TABLET ORAL 2 TIMES DAILY
Qty: 20 TABLET | Refills: 0 | Status: SHIPPED | OUTPATIENT
Start: 2024-07-03 | End: 2024-07-13

## 2024-07-03 ASSESSMENT — PAIN - FUNCTIONAL ASSESSMENT: PAIN_FUNCTIONAL_ASSESSMENT: NONE - DENIES PAIN

## 2024-07-03 NOTE — ED PROVIDER NOTES
[facility] EMERGENCY DEPT  EMERGENCY DEPARTMENT ENCOUNTER       Pt Name: Cheng Gamez  MRN: [id number]  Birthdate 1965  Date of evaluation: 7/3/2024  Provider: BLACK Donis   PCP: Addison Smith MD  Note Started: 9:04 AM EDT 7/3/24     CHIEF COMPLAINT       Chief Complaint   Patient presents with    Facial Swelling     Patient ambulatory into triage complaining of a bump that arose a couple days ago to the L side of the face by his eye. Patient reports that since, it has spread to under the eye and has had swelling. Patient does report squeezing the bump when it first came up but reports nothing came out.     HISTORY OF PRESENT ILLNESS: 1 or more elements      History From: Patient  HPI Limitations: None     Cheng Gamez is a 58 y.o. male who presents by POV with complaints of a pimple to his left temple that is painful.  He reports it started about 1 week ago and is progressively increased in size and pain.  There is been no drainage.  There is been no treatment prior to arrival.  He denies fever, chills, and other complaints.     Nursing Notes were all reviewed and agreed with or any disagreements were addressed in the HPI.     REVIEW OF SYSTEMS      Review of Systems     Positives and Pertinent negatives as per HPI.    PAST HISTORY     Past Medical History:  Past Medical History:   Diagnosis Date    Arthritis     Chronic pain     Degenerative disc disease at L5-S1 level     Depression     Gastrointestinal disorder     mass    Gastrointestinal disorder     pancreatitis    Hepatitis C     2018    Hyperlipidemia     Hypertension     Sciatica     Scoliosis        Past Surgical History:  Past Surgical History:   Procedure Laterality Date    COLONOSCOPY         Family History:  Family History   Problem Relation Age of Onset    Heart Disease Father     Coronary Art Dis Father     Osteoarthritis Father     Heart Surgery Mother     Coronary Art Dis Mother     Heart Attack Father     Migraines Sister

## 2024-07-03 NOTE — DISCHARGE INSTRUCTIONS
It was a pleasure taking care of you at [facility] Emergency Department today.  We know that when you come to [facility], you are entrusting us with your health, comfort, and safety.  Our physicians and nurses honor that trust, and we truly appreciate the opportunity to care for you and your loved ones.      We also value your feedback.  If you receive a survey about your Emergency Department experience today, please fill it out.  We care about our patients' feedback, and we listen to what you have to say.  Thank you!

## 2024-09-13 DIAGNOSIS — I10 ESSENTIAL (PRIMARY) HYPERTENSION: ICD-10-CM

## 2024-09-13 RX ORDER — LISINOPRIL AND HYDROCHLOROTHIAZIDE 20; 25 MG/1; MG/1
1 TABLET ORAL DAILY
Qty: 90 TABLET | Refills: 1 | Status: SHIPPED | OUTPATIENT
Start: 2024-09-13

## 2024-12-20 DIAGNOSIS — Z87.891 PERSONAL HISTORY OF TOBACCO USE: ICD-10-CM

## 2024-12-20 NOTE — TELEPHONE ENCOUNTER
Last appointment: 6/21/24  Next appointment: 5/1/25  Previous refill encounter(s): 6/21/24 #1 with 3 refills    Requested Prescriptions     Pending Prescriptions Disp Refills    albuterol sulfate HFA (PROVENTIL;VENTOLIN;PROAIR) 108 (90 Base) MCG/ACT inhaler [Pharmacy Med Name: ALBUTEROL HFA (PROVENTIL) INH] 1 each 3     Sig: TAKE 2 PUFFS BY MOUTH EVERY 6 HOURS AS NEEDED FOR WHEEZE         For Pharmacy Admin Tracking Only    Program: Medication Refill  CPA in place:    Recommendation Provided To:   Intervention Detail: New Rx: 1, reason: Patient Preference  Intervention Accepted By:   Gap Closed?:    Time Spent (min): 5

## 2024-12-23 RX ORDER — ALBUTEROL SULFATE 90 UG/1
INHALANT RESPIRATORY (INHALATION)
Qty: 1 EACH | Refills: 3 | Status: SHIPPED | OUTPATIENT
Start: 2024-12-23

## 2025-05-01 ENCOUNTER — OFFICE VISIT (OUTPATIENT)
Age: 60
End: 2025-05-01
Payer: COMMERCIAL

## 2025-05-01 VITALS
HEIGHT: 71 IN | HEART RATE: 73 BPM | OXYGEN SATURATION: 97 % | BODY MASS INDEX: 20.72 KG/M2 | SYSTOLIC BLOOD PRESSURE: 136 MMHG | WEIGHT: 148 LBS | RESPIRATION RATE: 18 BRPM | TEMPERATURE: 97.1 F | DIASTOLIC BLOOD PRESSURE: 87 MMHG

## 2025-05-01 DIAGNOSIS — Z00.00 ENCOUNTER FOR WELL ADULT EXAM WITHOUT ABNORMAL FINDINGS: Primary | ICD-10-CM

## 2025-05-01 DIAGNOSIS — Z12.5 SCREENING FOR PROSTATE CANCER: ICD-10-CM

## 2025-05-01 DIAGNOSIS — I10 ESSENTIAL HYPERTENSION: ICD-10-CM

## 2025-05-01 DIAGNOSIS — E78.00 HIGH CHOLESTEROL: ICD-10-CM

## 2025-05-01 DIAGNOSIS — Z87.891 PERSONAL HISTORY OF TOBACCO USE: ICD-10-CM

## 2025-05-01 DIAGNOSIS — F17.210 CIGARETTE NICOTINE DEPENDENCE WITHOUT COMPLICATION: ICD-10-CM

## 2025-05-01 LAB
ALBUMIN SERPL-MCNC: 4.4 G/DL (ref 3.5–5)
ALBUMIN/GLOB SERPL: 1.4 (ref 1.1–2.2)
ALP SERPL-CCNC: 77 U/L (ref 45–117)
ALT SERPL-CCNC: 32 U/L (ref 12–78)
ANION GAP SERPL CALC-SCNC: 6 MMOL/L (ref 2–12)
AST SERPL-CCNC: 31 U/L (ref 15–37)
BASOPHILS # BLD: 0.11 K/UL (ref 0–0.1)
BASOPHILS NFR BLD: 2.1 % (ref 0–1)
BILIRUB SERPL-MCNC: 0.8 MG/DL (ref 0.2–1)
BUN SERPL-MCNC: 8 MG/DL (ref 6–20)
BUN/CREAT SERPL: 10 (ref 12–20)
CALCIUM SERPL-MCNC: 9.7 MG/DL (ref 8.5–10.1)
CHLORIDE SERPL-SCNC: 100 MMOL/L (ref 97–108)
CHOLEST SERPL-MCNC: 156 MG/DL
CO2 SERPL-SCNC: 27 MMOL/L (ref 21–32)
CREAT SERPL-MCNC: 0.81 MG/DL (ref 0.7–1.3)
DIFFERENTIAL METHOD BLD: ABNORMAL
EOSINOPHIL # BLD: 0.19 K/UL (ref 0–0.4)
EOSINOPHIL NFR BLD: 3.6 % (ref 0–7)
ERYTHROCYTE [DISTWIDTH] IN BLOOD BY AUTOMATED COUNT: 13 % (ref 11.5–14.5)
GLOBULIN SER CALC-MCNC: 3.1 G/DL (ref 2–4)
GLUCOSE SERPL-MCNC: 105 MG/DL (ref 65–100)
HCT VFR BLD AUTO: 42 % (ref 36.6–50.3)
HDLC SERPL-MCNC: 121 MG/DL
HDLC SERPL: 1.3 (ref 0–5)
HGB BLD-MCNC: 14.5 G/DL (ref 12.1–17)
IMM GRANULOCYTES # BLD AUTO: 0.01 K/UL (ref 0–0.04)
IMM GRANULOCYTES NFR BLD AUTO: 0.2 % (ref 0–0.5)
LDLC SERPL CALC-MCNC: 27.8 MG/DL (ref 0–100)
LYMPHOCYTES # BLD: 1.41 K/UL (ref 0.8–3.5)
LYMPHOCYTES NFR BLD: 26.7 % (ref 12–49)
MCH RBC QN AUTO: 31.8 PG (ref 26–34)
MCHC RBC AUTO-ENTMCNC: 34.5 G/DL (ref 30–36.5)
MCV RBC AUTO: 92.1 FL (ref 80–99)
MONOCYTES # BLD: 0.84 K/UL (ref 0–1)
MONOCYTES NFR BLD: 15.9 % (ref 5–13)
NEUTS SEG # BLD: 2.72 K/UL (ref 1.8–8)
NEUTS SEG NFR BLD: 51.5 % (ref 32–75)
NRBC # BLD: 0 K/UL (ref 0–0.01)
NRBC BLD-RTO: 0 PER 100 WBC
PLATELET # BLD AUTO: 280 K/UL (ref 150–400)
PMV BLD AUTO: 9.8 FL (ref 8.9–12.9)
POTASSIUM SERPL-SCNC: 4.2 MMOL/L (ref 3.5–5.1)
PROT SERPL-MCNC: 7.5 G/DL (ref 6.4–8.2)
PSA SERPL-MCNC: 1.3 NG/ML (ref 0.01–4)
RBC # BLD AUTO: 4.56 M/UL (ref 4.1–5.7)
SODIUM SERPL-SCNC: 133 MMOL/L (ref 136–145)
T4 FREE SERPL-MCNC: 0.9 NG/DL (ref 0.8–1.5)
TRIGL SERPL-MCNC: 36 MG/DL
TSH SERPL DL<=0.05 MIU/L-ACNC: 2.08 UIU/ML (ref 0.36–3.74)
VLDLC SERPL CALC-MCNC: 7.2 MG/DL
WBC # BLD AUTO: 5.3 K/UL (ref 4.1–11.1)

## 2025-05-01 PROCEDURE — 3075F SYST BP GE 130 - 139MM HG: CPT | Performed by: FAMILY MEDICINE

## 2025-05-01 PROCEDURE — 99396 PREV VISIT EST AGE 40-64: CPT | Performed by: FAMILY MEDICINE

## 2025-05-01 PROCEDURE — G0296 VISIT TO DETERM LDCT ELIG: HCPCS | Performed by: FAMILY MEDICINE

## 2025-05-01 PROCEDURE — 3079F DIAST BP 80-89 MM HG: CPT | Performed by: FAMILY MEDICINE

## 2025-05-01 SDOH — ECONOMIC STABILITY: FOOD INSECURITY: WITHIN THE PAST 12 MONTHS, THE FOOD YOU BOUGHT JUST DIDN'T LAST AND YOU DIDN'T HAVE MONEY TO GET MORE.: NEVER TRUE

## 2025-05-01 SDOH — ECONOMIC STABILITY: FOOD INSECURITY: WITHIN THE PAST 12 MONTHS, YOU WORRIED THAT YOUR FOOD WOULD RUN OUT BEFORE YOU GOT MONEY TO BUY MORE.: NEVER TRUE

## 2025-05-01 ASSESSMENT — PATIENT HEALTH QUESTIONNAIRE - PHQ9
SUM OF ALL RESPONSES TO PHQ QUESTIONS 1-9: 0
2. FEELING DOWN, DEPRESSED OR HOPELESS: NOT AT ALL
1. LITTLE INTEREST OR PLEASURE IN DOING THINGS: NOT AT ALL
SUM OF ALL RESPONSES TO PHQ QUESTIONS 1-9: 0

## 2025-05-01 NOTE — PATIENT INSTRUCTIONS
times 20. So you have a smoking history of 20 pack years.  If you smoked 2 packs a day for 10 years, that's 2 times 10. So you have a smoking history of 20 pack years.  Experts agree that screening is for people who have a high risk of lung cancer. But experts don't agree on what high risk means. Some say people age 50 or older with at least a 20-pack-year smoking history are high risk. Others say it's people age 55 or older with a 30-pack-year history.  To see if you could benefit from screening, first find out if you are at high risk for lung cancer. Your doctor can help you decide your lung cancer risk.  What are the risks of screening?  CT screening for lung cancer isn't perfect. It can show an abnormal result when it turns out there wasn't any cancer. This is called a false-positive result. This means you may need more tests to make sure you don't have cancer. These tests can be harmful and cause a lot of worry.  These tests may include more CT scans and invasive testing like a lung biopsy. In a biopsy, the doctor takes a sample of tissue from inside your lung so it can be looked at under a microscope. A biopsy is the only way to tell if you have lung cancer. If the biopsy finds cancer, you and your doctor will have to decide how or whether to treat it.  Some lung cancers found on CT scans are harmless and would not have caused a problem if they had not been found through screening. But because doctors can't tell which ones will turn out to be harmless, most will be treated. This means that you may get treatment--including surgery, radiation, or chemotherapy--that you don't need.  There is a risk of damage to cells or tissue from being exposed to radiation, including the small amounts used in CTs, X-rays, and other medical tests. Over time, exposure to radiation may cause cancer and other health problems. But in most cases, the risk of getting cancer from being exposed to small amounts of radiation is low. It's

## 2025-05-01 NOTE — PROGRESS NOTES
Chief Complaint   Patient presents with    Annual Exam    Hypertension     Follow up        \"Have you been to the ER, urgent care clinic since your last visit?  Hospitalized since your last visit?\"    NO    “Have you seen or consulted any other health care providers outside of [facility] since your last visit?”    NO        Click Here for Release of Records Request     No results found for this visit on 25.   Vitals:    25 0904   BP: 136/87   Pulse: 73   Resp: 18   Temp: 97.1 °F (36.2 °C)   TempSrc: Temporal   SpO2: 97%   Weight: 67.1 kg (148 lb)   Height: 1.803 m (5' 11\")          The patient, Cheng Gamez, identity was verified by name and .

## 2025-05-01 NOTE — PROGRESS NOTES
Well Adult Note  Name: Cheng Gamez Today’s Date: 2025   MRN: [id number] Sex: Male   Age: 59 y.o. Ethnicity: Non- / Non    : 1965 Race: White (non-)      Cheng Gamez is here for a well adult exam.       Assessment & Plan   Encounter for well adult exam without abnormal findings  Personal history of tobacco use  -     MD VISIT TO DISCUSS LUNG CA SCREEN W LDCT  -     CT Lung Screen (Initial/Annual/Baseline); Future  Screening for prostate cancer  -     PSA Screening; Future  Essential hypertension  -     Lipid Panel; Future  -     CBC with Auto Differential; Future  -     Comprehensive Metabolic Panel; Future  -     TSH + Free T4 Panel; Future  High cholesterol  -     Lipid Panel; Future  -     CBC with Auto Differential; Future  -     Comprehensive Metabolic Panel; Future  -     TSH + Free T4 Panel; Future  Cigarette nicotine dependence without complication  -     Lipid Panel; Future  -     CBC with Auto Differential; Future  -     Comprehensive Metabolic Panel; Future  -     TSH + Free T4 Panel; Future      Return if symptoms worsen or fail to improve.       Subjective   Patient with history of hypertension cholesterolemia tobacco dependence presents for annual wellness visit denies any complaint at this time    Review of Systems    ROS:    Constitutional: no fever, nl energy levels, nl sleep patterns, nl appetite, and nl weight fluctuations,  - exercise habits,  nad     HENT: no ear pain or nosebleeds. No blurred vision  Respiratory: no shortness of breath, wheezing cough   Cardiovascular: Has no chest pain, ,and racing heart .   Gastrointestinal: No constipation, diarrhea, nausea and vomiting.   Genitourinary: No frequency.   Musculoskeletal: Negative for joint pain.   Skin: no itching, no rash.   Neurological: Negative for dizziness, no tremors  Psychiatric/Behavioral: no for depression  no nervous/anxious, nl stress levels, and overall emotional well-being .

## 2025-05-07 ENCOUNTER — RESULTS FOLLOW-UP (OUTPATIENT)
Age: 60
End: 2025-05-07

## 2025-05-19 DIAGNOSIS — I10 ESSENTIAL (PRIMARY) HYPERTENSION: ICD-10-CM

## 2025-05-19 DIAGNOSIS — F51.02 ADJUSTMENT INSOMNIA: ICD-10-CM

## 2025-05-19 DIAGNOSIS — I10 ESSENTIAL HYPERTENSION: ICD-10-CM

## 2025-05-20 RX ORDER — TRAZODONE HYDROCHLORIDE 150 MG/1
150 TABLET ORAL NIGHTLY
Qty: 90 TABLET | Refills: 1 | Status: SHIPPED | OUTPATIENT
Start: 2025-05-20

## 2025-05-20 RX ORDER — LISINOPRIL AND HYDROCHLOROTHIAZIDE 20; 25 MG/1; MG/1
1 TABLET ORAL DAILY
Qty: 90 TABLET | Refills: 1 | Status: SHIPPED | OUTPATIENT
Start: 2025-05-20

## 2025-05-20 RX ORDER — RIVAROXABAN 20 MG/1
20 TABLET, FILM COATED ORAL
Qty: 90 TABLET | Refills: 1 | Status: SHIPPED | OUTPATIENT
Start: 2025-05-20

## 2025-05-20 NOTE — TELEPHONE ENCOUNTER
Last appointment: 5/1/25  Next appointment: none  Previous refill encounter(s): 9/13/24 Prinzide #90 with 1 refill, 6/21/24 Desyrel & Xarelto    Requested Prescriptions     Pending Prescriptions Disp Refills    lisinopril-hydroCHLOROthiazide (PRINZIDE;ZESTORETIC) 20-25 MG per tablet [Pharmacy Med Name: LISINOPRIL-HCTZ 20-25 MG TAB] 90 tablet 1     Sig: TAKE 1 TABLET BY MOUTH EVERY DAY    traZODone (DESYREL) 150 MG tablet [Pharmacy Med Name: TRAZODONE 150 MG TABLET] 90 tablet 1     Sig: TAKE 1 TABLET BY MOUTH NIGHTLY SLEEP    XARELTO 20 MG TABS tablet [Pharmacy Med Name: XARELTO 20 MG TABLET] 90 tablet 1     Sig: TAKE 1 TABLET BY MOUTH DAILY WITH SUPPER         For Pharmacy Admin Tracking Only    Program: Medication Refill  CPA in place:    Recommendation Provided To:   Intervention Detail: New Rx: 3, reason: Patient Preference  Intervention Accepted By:   Gap Closed?:    Time Spent (min): 5

## 2025-06-05 DIAGNOSIS — E55.9 VITAMIN D DEFICIENCY, UNSPECIFIED: ICD-10-CM

## 2025-06-05 NOTE — TELEPHONE ENCOUNTER
Last appointment: 5/1/25  Next appointment: none  Previous refill encounter(s): 5/23/24    Requested Prescriptions     Pending Prescriptions Disp Refills    vitamin D (ERGOCALCIFEROL) 1.25 MG (96409 UT) CAPS capsule [Pharmacy Med Name: VITAMIN D2 1.25MG(50,000 UNIT)] 12 capsule 1     Sig: Take 1 capsule by mouth once a week         For Pharmacy Admin Tracking Only    Program: Medication Refill  CPA in place:    Recommendation Provided To:   Intervention Detail: New Rx: 1, reason: Patient Preference  Intervention Accepted By:   Gap Closed?:    Time Spent (min): 5

## 2025-06-08 RX ORDER — ERGOCALCIFEROL 1.25 MG/1
50000 CAPSULE, LIQUID FILLED ORAL WEEKLY
Qty: 12 CAPSULE | Refills: 1 | Status: SHIPPED | OUTPATIENT
Start: 2025-06-08

## 2025-06-26 DIAGNOSIS — I10 ESSENTIAL (PRIMARY) HYPERTENSION: ICD-10-CM

## 2025-06-26 DIAGNOSIS — E78.2 MIXED HYPERLIPIDEMIA: ICD-10-CM

## 2025-06-26 NOTE — TELEPHONE ENCOUNTER
Last appointment: 5/1/25  Next appointment: none  Previous refill encounter(s): 6/21/24    Requested Prescriptions     Pending Prescriptions Disp Refills    rosuvastatin (CRESTOR) 10 MG tablet [Pharmacy Med Name: ROSUVASTATIN CALCIUM 10 MG TAB] 90 tablet 3     Sig: TAKE 1 TABLET BY MOUTH EVERYDAY AT BEDTIME         For Pharmacy Admin Tracking Only    Program: Medication Refill  CPA in place:    Recommendation Provided To:   Intervention Detail: New Rx: 1, reason: Patient Preference  Intervention Accepted By:   Gap Closed?:    Time Spent (min): 5

## 2025-06-29 RX ORDER — ROSUVASTATIN CALCIUM 10 MG/1
10 TABLET, COATED ORAL
Qty: 90 TABLET | Refills: 3 | Status: SHIPPED | OUTPATIENT
Start: 2025-06-29

## 2025-08-09 DIAGNOSIS — Z87.891 PERSONAL HISTORY OF TOBACCO USE: ICD-10-CM

## 2025-08-15 RX ORDER — ALBUTEROL SULFATE 90 UG/1
INHALANT RESPIRATORY (INHALATION)
Qty: 6.7 EACH | Refills: 3 | Status: SHIPPED | OUTPATIENT
Start: 2025-08-15